# Patient Record
Sex: MALE | Race: BLACK OR AFRICAN AMERICAN | NOT HISPANIC OR LATINO | Employment: PART TIME | URBAN - METROPOLITAN AREA
[De-identification: names, ages, dates, MRNs, and addresses within clinical notes are randomized per-mention and may not be internally consistent; named-entity substitution may affect disease eponyms.]

---

## 2018-11-27 ENCOUNTER — HOSPITAL ENCOUNTER (INPATIENT)
Facility: HOSPITAL | Age: 58
LOS: 3 days | Discharge: HOME | DRG: 139 | End: 2018-12-01
Attending: EMERGENCY MEDICINE | Admitting: INTERNAL MEDICINE
Payer: COMMERCIAL

## 2018-11-27 ENCOUNTER — APPOINTMENT (EMERGENCY)
Dept: NON INVASIVE DIAGNOSTICS | Facility: HOSPITAL | Age: 58
DRG: 139 | End: 2018-11-27
Payer: COMMERCIAL

## 2018-11-27 ENCOUNTER — APPOINTMENT (EMERGENCY)
Dept: RADIOLOGY | Facility: HOSPITAL | Age: 58
DRG: 139 | End: 2018-11-27
Payer: COMMERCIAL

## 2018-11-27 DIAGNOSIS — R04.2 HEMOPTYSIS: Primary | ICD-10-CM

## 2018-11-27 DIAGNOSIS — R91.8 RIGHT LOWER LOBE LUNG MASS: ICD-10-CM

## 2018-11-27 DIAGNOSIS — R93.89 ABNORMAL CT SCAN: ICD-10-CM

## 2018-11-27 DIAGNOSIS — I10 ELEVATED SYSTOLIC BLOOD PRESSURE READING WITH DIAGNOSIS OF HYPERTENSION: ICD-10-CM

## 2018-11-27 DIAGNOSIS — R05.8 PRODUCTIVE COUGH: ICD-10-CM

## 2018-11-27 PROBLEM — E78.5 HYPERLIPIDEMIA: Status: ACTIVE | Noted: 2018-04-01

## 2018-11-27 PROBLEM — G47.33 OSA (OBSTRUCTIVE SLEEP APNEA): Status: ACTIVE | Noted: 2018-04-01

## 2018-11-27 PROBLEM — E11.9 TYPE 2 DIABETES MELLITUS WITHOUT COMPLICATION (HCC): Status: ACTIVE | Noted: 2018-04-01

## 2018-11-27 PROBLEM — F17.200 TOBACCO USE DISORDER: Status: ACTIVE | Noted: 2018-04-01

## 2018-11-27 LAB
ALBUMIN SERPL BCP-MCNC: 3.4 G/DL (ref 3.5–5)
ALP SERPL-CCNC: 67 U/L (ref 46–116)
ALT SERPL W P-5'-P-CCNC: 24 U/L (ref 12–78)
ANION GAP SERPL CALCULATED.3IONS-SCNC: 6 MMOL/L (ref 4–13)
APTT PPP: 26 SECONDS (ref 26–38)
AST SERPL W P-5'-P-CCNC: 18 U/L (ref 5–45)
BASOPHILS # BLD AUTO: 0.07 THOUSANDS/ΜL (ref 0–0.1)
BASOPHILS NFR BLD AUTO: 1 % (ref 0–1)
BILIRUB SERPL-MCNC: 0.44 MG/DL (ref 0.2–1)
BUN SERPL-MCNC: 16 MG/DL (ref 5–25)
CALCIUM SERPL-MCNC: 9.6 MG/DL (ref 8.3–10.1)
CHLORIDE SERPL-SCNC: 109 MMOL/L (ref 100–108)
CO2 SERPL-SCNC: 25 MMOL/L (ref 21–32)
CREAT SERPL-MCNC: 1.01 MG/DL (ref 0.6–1.3)
EOSINOPHIL # BLD AUTO: 0.16 THOUSAND/ΜL (ref 0–0.61)
EOSINOPHIL NFR BLD AUTO: 3 % (ref 0–6)
ERYTHROCYTE [DISTWIDTH] IN BLOOD BY AUTOMATED COUNT: 13.1 % (ref 11.6–15.1)
GFR SERPL CREATININE-BSD FRML MDRD: 94 ML/MIN/1.73SQ M
GLUCOSE SERPL-MCNC: 90 MG/DL (ref 65–140)
HCT VFR BLD AUTO: 43.7 % (ref 36.5–49.3)
HGB BLD-MCNC: 14.2 G/DL (ref 12–17)
IMM GRANULOCYTES # BLD AUTO: 0.03 THOUSAND/UL (ref 0–0.2)
IMM GRANULOCYTES NFR BLD AUTO: 1 % (ref 0–2)
INR PPP: 0.97 (ref 0.86–1.17)
LYMPHOCYTES # BLD AUTO: 2.38 THOUSANDS/ΜL (ref 0.6–4.47)
LYMPHOCYTES NFR BLD AUTO: 40 % (ref 14–44)
MCH RBC QN AUTO: 30.3 PG (ref 26.8–34.3)
MCHC RBC AUTO-ENTMCNC: 32.5 G/DL (ref 31.4–37.4)
MCV RBC AUTO: 93 FL (ref 82–98)
MONOCYTES # BLD AUTO: 0.56 THOUSAND/ΜL (ref 0.17–1.22)
MONOCYTES NFR BLD AUTO: 9 % (ref 4–12)
NEUTROPHILS # BLD AUTO: 2.83 THOUSANDS/ΜL (ref 1.85–7.62)
NEUTS SEG NFR BLD AUTO: 46 % (ref 43–75)
NRBC BLD AUTO-RTO: 0 /100 WBCS
PLATELET # BLD AUTO: 275 THOUSANDS/UL (ref 149–390)
PMV BLD AUTO: 9.5 FL (ref 8.9–12.7)
POTASSIUM SERPL-SCNC: 3.8 MMOL/L (ref 3.5–5.3)
PROCALCITONIN SERPL-MCNC: <0.05 NG/ML
PROT SERPL-MCNC: 6.7 G/DL (ref 6.4–8.2)
PROTHROMBIN TIME: 13 SECONDS (ref 11.8–14.2)
RBC # BLD AUTO: 4.69 MILLION/UL (ref 3.88–5.62)
SODIUM SERPL-SCNC: 140 MMOL/L (ref 136–145)
TROPONIN I SERPL-MCNC: <0.02 NG/ML
WBC # BLD AUTO: 6.03 THOUSAND/UL (ref 4.31–10.16)

## 2018-11-27 PROCEDURE — 36415 COLL VENOUS BLD VENIPUNCTURE: CPT | Performed by: EMERGENCY MEDICINE

## 2018-11-27 PROCEDURE — 87205 SMEAR GRAM STAIN: CPT | Performed by: INTERNAL MEDICINE

## 2018-11-27 PROCEDURE — 87631 RESP VIRUS 3-5 TARGETS: CPT | Performed by: INTERNAL MEDICINE

## 2018-11-27 PROCEDURE — 93970 EXTREMITY STUDY: CPT

## 2018-11-27 PROCEDURE — 87449 NOS EACH ORGANISM AG IA: CPT | Performed by: INTERNAL MEDICINE

## 2018-11-27 PROCEDURE — 93005 ELECTROCARDIOGRAM TRACING: CPT

## 2018-11-27 PROCEDURE — 84145 PROCALCITONIN (PCT): CPT | Performed by: INTERNAL MEDICINE

## 2018-11-27 PROCEDURE — 87070 CULTURE OTHR SPECIMN AEROBIC: CPT | Performed by: INTERNAL MEDICINE

## 2018-11-27 PROCEDURE — 84484 ASSAY OF TROPONIN QUANT: CPT | Performed by: EMERGENCY MEDICINE

## 2018-11-27 PROCEDURE — 99285 EMERGENCY DEPT VISIT HI MDM: CPT

## 2018-11-27 PROCEDURE — 85610 PROTHROMBIN TIME: CPT | Performed by: EMERGENCY MEDICINE

## 2018-11-27 PROCEDURE — 96367 TX/PROPH/DG ADDL SEQ IV INF: CPT

## 2018-11-27 PROCEDURE — 85025 COMPLETE CBC W/AUTO DIFF WBC: CPT | Performed by: EMERGENCY MEDICINE

## 2018-11-27 PROCEDURE — 96365 THER/PROPH/DIAG IV INF INIT: CPT

## 2018-11-27 PROCEDURE — 71275 CT ANGIOGRAPHY CHEST: CPT

## 2018-11-27 PROCEDURE — 80053 COMPREHEN METABOLIC PANEL: CPT | Performed by: EMERGENCY MEDICINE

## 2018-11-27 PROCEDURE — 85730 THROMBOPLASTIN TIME PARTIAL: CPT | Performed by: EMERGENCY MEDICINE

## 2018-11-27 PROCEDURE — 99220 PR INITIAL OBSERVATION CARE/DAY 70 MINUTES: CPT | Performed by: INTERNAL MEDICINE

## 2018-11-27 RX ORDER — MAGNESIUM HYDROXIDE/ALUMINUM HYDROXICE/SIMETHICONE 120; 1200; 1200 MG/30ML; MG/30ML; MG/30ML
30 SUSPENSION ORAL EVERY 6 HOURS PRN
Status: DISCONTINUED | OUTPATIENT
Start: 2018-11-27 | End: 2018-12-01 | Stop reason: HOSPADM

## 2018-11-27 RX ORDER — ATORVASTATIN CALCIUM 40 MG/1
40 TABLET, FILM COATED ORAL
COMMUNITY
Start: 2018-05-31

## 2018-11-27 RX ORDER — METOPROLOL SUCCINATE 50 MG/1
50 TABLET, EXTENDED RELEASE ORAL
COMMUNITY
Start: 2018-05-31

## 2018-11-27 RX ORDER — ALBUTEROL SULFATE 90 UG/1
2 AEROSOL, METERED RESPIRATORY (INHALATION) EVERY 4 HOURS PRN
Status: DISCONTINUED | OUTPATIENT
Start: 2018-11-27 | End: 2018-12-01 | Stop reason: HOSPADM

## 2018-11-27 RX ORDER — METOPROLOL SUCCINATE 50 MG/1
50 TABLET, EXTENDED RELEASE ORAL DAILY
Status: DISCONTINUED | OUTPATIENT
Start: 2018-11-28 | End: 2018-12-01 | Stop reason: HOSPADM

## 2018-11-27 RX ORDER — FAMOTIDINE 20 MG/1
20 TABLET, FILM COATED ORAL ONCE
Status: COMPLETED | OUTPATIENT
Start: 2018-11-27 | End: 2018-11-27

## 2018-11-27 RX ORDER — LISINOPRIL 40 MG/1
40 TABLET ORAL
COMMUNITY
Start: 2018-05-31

## 2018-11-27 RX ORDER — MAGNESIUM HYDROXIDE/ALUMINUM HYDROXICE/SIMETHICONE 120; 1200; 1200 MG/30ML; MG/30ML; MG/30ML
15 SUSPENSION ORAL ONCE
Status: COMPLETED | OUTPATIENT
Start: 2018-11-27 | End: 2018-11-27

## 2018-11-27 RX ORDER — ATORVASTATIN CALCIUM 40 MG/1
40 TABLET, FILM COATED ORAL
Status: DISCONTINUED | OUTPATIENT
Start: 2018-11-28 | End: 2018-12-01 | Stop reason: HOSPADM

## 2018-11-27 RX ORDER — LISINOPRIL 20 MG/1
40 TABLET ORAL DAILY
Status: DISCONTINUED | OUTPATIENT
Start: 2018-11-28 | End: 2018-12-01 | Stop reason: HOSPADM

## 2018-11-27 RX ORDER — NICOTINE 21 MG/24HR
1 PATCH, TRANSDERMAL 24 HOURS TRANSDERMAL DAILY
Status: DISCONTINUED | OUTPATIENT
Start: 2018-11-28 | End: 2018-12-01 | Stop reason: HOSPADM

## 2018-11-27 RX ADMIN — AZITHROMYCIN MONOHYDRATE 500 MG: 500 INJECTION, POWDER, LYOPHILIZED, FOR SOLUTION INTRAVENOUS at 19:57

## 2018-11-27 RX ADMIN — FAMOTIDINE 20 MG: 20 TABLET ORAL at 21:30

## 2018-11-27 RX ADMIN — ALUMINUM HYDROXIDE, MAGNESIUM HYDROXIDE, AND SIMETHICONE 15 ML: 200; 200; 20 SUSPENSION ORAL at 21:30

## 2018-11-27 RX ADMIN — METOPROLOL TARTRATE 25 MG: 25 TABLET ORAL at 20:44

## 2018-11-27 RX ADMIN — CEFTRIAXONE SODIUM 1000 MG: 10 INJECTION, POWDER, FOR SOLUTION INTRAVENOUS at 18:47

## 2018-11-27 RX ADMIN — IOHEXOL 85 ML: 350 INJECTION, SOLUTION INTRAVENOUS at 17:37

## 2018-11-27 NOTE — ED NOTES
Pt reports just started smoking cigarettes again   Pt coughed during triage and sputum was clear     Abhi Castellanos RN  11/27/18 2922

## 2018-11-27 NOTE — LETTER
[address] [place] [address] 6  [address] [address]  Dept: [phone]    November 30, 2018     Patient: Leta Leong   YOB: 1960   Date of Visit: 11/27/2018       To Whom it May Concern:    Leta Leong is under my professional care  He was seen in the hospital from 11/27/2018   to 12/1/18  He must refrain from strenuous activities while he's recovering for a period of two weeks after his hospitalization  If you have any questions or concerns, please don't hesitate to call           Sincerely,          Syed Mitchell MD

## 2018-11-27 NOTE — ED PROVIDER NOTES
History  Chief Complaint   Patient presents with    Medical Problem     Pt reports coughing up blood x2days  Pt denies SOB, cp     44-year-old male presents emergency department for evaluation of 2 day history of hemoptysis  Patient states 2 week history cough productive with white/clear sputum which he states now has streaks of blood  Pt also states recently noted bilateral LE edema L > R which he states no hx of previously  Pt denies any trauma/injury, headache, vision changes, dizziness/lightheadedness/syncope, neck pain/back pain, chest pain, stridor/wheezing, fever/chills, abdominal pain, nausea/vomiting/diarrhea, urinary symptoms, rash focal neuro deficits  Patient has past medical history of hypertension, hyperlipidemia and diabetes  Pt has a past surgical history that includes Rotator cuff repair (Right) and Repair knee ligament (Left)  Pt is a current smoker, occasional ETOH use, denies any recreational drug use  No recent travel or known sick contacts  No recent changes to medication regimen  No interventions prior to arrival, no other complaints at this time  Prior to Admission Medications   Prescriptions Last Dose Informant Patient Reported? Taking?    ASPIRIN 81 PO 11/27/2018 at Unknown time  Yes Yes   Sig: Take 81 mg by mouth   atorvastatin (LIPITOR) 40 mg tablet 11/27/2018 at Unknown time  Yes Yes   Sig: Take 40 mg by mouth   lisinopril (ZESTRIL) 40 mg tablet 11/27/2018 at Unknown time  Yes Yes   Sig: Take 40 mg by mouth   metFORMIN (GLUCOPHAGE) 500 mg tablet 11/27/2018 at Unknown time  Yes Yes   Sig: Take 500 mg by mouth   metoprolol succinate (TOPROL-XL) 50 mg 24 hr tablet 11/27/2018 at Unknown time  Yes Yes   Sig: Take 50 mg by mouth      Facility-Administered Medications: None       Past Medical History:   Diagnosis Date    Diabetes mellitus ([address] )     Hyperlipidemia     Hypertension     Sciatica        Past Surgical History:   Procedure Laterality Date    REPAIR KNEE LIGAMENT Left     ROTATOR CUFF REPAIR Right        Family History   Problem Relation Age of Onset    Lung cancer Father      I have reviewed and agree with the history as documented  Social History   Substance Use Topics    Smoking status: Current Every Day Smoker     Packs/day: 2 00     Years: 30 00     Types: Cigarettes    Smokeless tobacco: Never Used    Alcohol use No        Review of Systems   Constitutional: Negative for chills, fatigue and fever  HENT: Negative for congestion, rhinorrhea and sore throat  Eyes: Negative for pain, redness and visual disturbance  Respiratory: Positive for cough (with hemoptysis)  Negative for chest tightness, shortness of breath, wheezing and stridor  Cardiovascular: Positive for leg swelling (johanne LE swelling L > R)  Negative for chest pain and palpitations  Gastrointestinal: Negative for abdominal pain, blood in stool, constipation, diarrhea, nausea and vomiting  Genitourinary: Negative for dysuria, frequency, hematuria and urgency  Musculoskeletal: Negative for back pain and neck pain  Skin: Negative for pallor and rash  Neurological: Negative for dizziness, seizures, syncope, weakness, light-headedness and headaches  Psychiatric/Behavioral: Negative for agitation and confusion         Physical Exam  ED Triage Vitals   Temperature Pulse Respirations Blood Pressure SpO2   11/27/18 1508 11/27/18 1508 11/27/18 1508 11/27/18 1508 11/27/18 1508   98 °F (36 7 °C) 87 16 167/91 94 %      Temp Source Heart Rate Source Patient Position - Orthostatic VS BP Location FiO2 (%)   11/27/18 1508 11/27/18 1508 11/27/18 1630 11/27/18 1508 --   Oral Monitor Lying Left arm       Pain Score       11/27/18 2002       No Pain           Orthostatic Vital Signs  Vitals:    11/27/18 2100 11/27/18 2235 11/28/18 0832 11/28/18 0834   BP: 161/90 (!) 165/104 144/78 144/78   Pulse: 72 64 71 57   Patient Position - Orthostatic VS: Lying          Physical Exam   Constitutional: He is oriented to person, place, and time  He appears well-developed and well-nourished  No distress  HENT:   Head: Normocephalic and atraumatic  Nose: Nose normal    Mouth/Throat: Oropharynx is clear and moist  No oropharyngeal exudate  Eyes: Pupils are equal, round, and reactive to light  Conjunctivae and EOM are normal  Right eye exhibits no discharge  Left eye exhibits no discharge  Neck: Normal range of motion  Neck supple  No JVD present  No tracheal deviation present  Cardiovascular: Normal rate, regular rhythm, normal heart sounds and intact distal pulses  Exam reveals no gallop and no friction rub  No murmur heard  Pulmonary/Chest: Effort normal and breath sounds normal  No stridor  No respiratory distress  He has no wheezes  He has no rales  He exhibits no tenderness  Bedside container with sputum with streaks of blood noted   Abdominal: Soft  Bowel sounds are normal  He exhibits no distension  There is no tenderness  There is no rebound and no guarding  Musculoskeletal: Normal range of motion  He exhibits edema (bilateral LE pitting edema L > R)  He exhibits no tenderness or deformity  Neurological: He is alert and oriented to person, place, and time  No cranial nerve deficit  Skin: Skin is warm and dry  Capillary refill takes less than 2 seconds  No rash noted  He is not diaphoretic  Psychiatric: He has a normal mood and affect  Nursing note and vitals reviewed        ED Medications  Medications   lisinopril (ZESTRIL) tablet 40 mg (40 mg Oral Given 11/28/18 0835)   atorvastatin (LIPITOR) tablet 40 mg (not administered)   metoprolol succinate (TOPROL-XL) 24 hr tablet 50 mg (50 mg Oral Given 11/28/18 0834)   nicotine (NICODERM CQ) 21 mg/24 hr TD 24 hr patch 1 patch (1 patch Transdermal Not Given 11/28/18 0840)   aluminum-magnesium hydroxide-simethicone (MYLANTA) 200-200-20 mg/5 mL oral suspension 30 mL (not administered)   cefTRIAXone (ROCEPHIN) 1,000 mg in dextrose 5 % 50 mL IVPB (not administered)     And   azithromycin (ZITHROMAX) 500 mg in sodium chloride 0 9 % 250 mL IVPB (not administered)   insulin lispro (HumaLOG) 100 units/mL subcutaneous injection 1-6 Units (1 Units Subcutaneous Not Given 11/28/18 0839)   insulin lispro (HumaLOG) 100 units/mL subcutaneous injection 1-5 Units (1 Units Subcutaneous Not Given 11/28/18 0033)   albuterol (PROVENTIL HFA,VENTOLIN HFA) inhaler 2 puff (not administered)   iohexol (OMNIPAQUE) 350 MG/ML injection (MULTI-DOSE) 85 mL (85 mL Intravenous Given 11/27/18 1737)   ceftriaxone (ROCEPHIN) 1 g/50 mL in dextrose IVPB (0 mg Intravenous Stopped 11/27/18 1951)   azithromycin (ZITHROMAX) 500 mg in sodium chloride 0 9% 250mL IVPB 500 mg (0 mg Intravenous Stopped 11/27/18 2044)   metoprolol tartrate (LOPRESSOR) tablet 25 mg (25 mg Oral Given 11/27/18 2044)   famotidine (PEPCID) tablet 20 mg (20 mg Oral Given 11/27/18 2130)   aluminum-magnesium hydroxide-simethicone (MYLANTA) 200-200-20 mg/5 mL oral suspension 15 mL (15 mL Oral Given 11/27/18 2130)       Diagnostic Studies  Results Reviewed     Procedure Component Value Units Date/Time    Troponin I [[id number]]  (Normal) Collected:  11/27/18 1629    Lab Status:  Final result Specimen:  Blood from Arm, Right Updated:  11/27/18 1700     Troponin I <0 02 ng/mL     Comprehensive metabolic panel [[id number]]  (Abnormal) Collected:  11/27/18 1629    Lab Status:  Final result Specimen:  Blood from Arm, Right Updated:  11/27/18 1658     Sodium 140 mmol/L      Potassium 3 8 mmol/L      Chloride 109 (H) mmol/L      CO2 25 mmol/L      ANION GAP 6 mmol/L      BUN 16 mg/dL      Creatinine 1 01 mg/dL      Glucose 90 mg/dL      Calcium 9 6 mg/dL      AST 18 U/L      ALT 24 U/L      Alkaline Phosphatase 67 U/L      Total Protein 6 7 g/dL      Albumin 3 4 (L) g/dL      Total Bilirubin 0 44 mg/dL      eGFR 94 ml/min/1 73sq m     Narrative:         National Kidney Disease Education Program recommendations are as follows:  GFR calculation is accurate only with a steady state creatinine  Chronic Kidney disease less than 60 ml/min/1 73 sq  meters  Kidney failure less than 15 ml/min/1 73 sq  meters  Protime-INR [[id number]]  (Normal) Collected:  11/27/18 1629    Lab Status:  Final result Specimen:  Blood from Arm, Right Updated:  11/27/18 1654     Protime 13 0 seconds      INR 0 97    APTT [[id number]]  (Normal) Collected:  11/27/18 1629    Lab Status:  Final result Specimen:  Blood from Arm, Right Updated:  11/27/18 1654     PTT 26 seconds     CBC and differential [[id number]] Collected:  11/27/18 1629    Lab Status:  Final result Specimen:  Blood from Arm, Right Updated:  11/27/18 1639     WBC 6 03 Thousand/uL      RBC 4 69 Million/uL      Hemoglobin 14 2 g/dL      Hematocrit 43 7 %      MCV 93 fL      MCH 30 3 pg      MCHC 32 5 g/dL      RDW 13 1 %      MPV 9 5 fL      Platelets 256 Thousands/uL      nRBC 0 /100 WBCs      Neutrophils Relative 46 %      Immat GRANS % 1 %      Lymphocytes Relative 40 %      Monocytes Relative 9 %      Eosinophils Relative 3 %      Basophils Relative 1 %      Neutrophils Absolute 2 83 Thousands/µL      Immature Grans Absolute 0 03 Thousand/uL      Lymphocytes Absolute 2 38 Thousands/µL      Monocytes Absolute 0 56 Thousand/µL      Eosinophils Absolute 0 16 Thousand/µL      Basophils Absolute 0 07 Thousands/µL                  CTA ED chest PE study   Final Result by Alejandro Barnes MD (11/27 1802)      Bilateral lung emphysematous changes predominantly right upper lobe  Right lower lobe consolidation measuring approximately 6 cm possibly related to pneumonia  Posttreatment follow-up to radiographic resolution is recommended in 1-2 months to exclude other etiology such as neoplasm which is not excluded (other less likely    differential would be from a nonvisualized subsegmental pulmonary embolus)        2 cm low attenuating lesion in the pancreatic head, correlate with nonemergent outpatient contrast enhanced abdominal MRI or CT recommended  The study was marked in EPIC for significant notification  Workstation performed: [id number]         VAS lower limb venous duplex study, complete bilateral    (Results Pending)  CONCLUSION:  RIGHT LOWER LIMB:  No evidence of acute or chronic deep vein thrombosis  No evidence of superficial thrombophlebitis noted  Doppler evaluation shows a normal response to augmentation maneuvers  Popliteal, posterior tibial and anterior tibial arterial Doppler waveforms are  triphasic  LEFT LOWER LIMB:  No evidence of acute or chronic deep vein thrombosis  No evidence of superficial thrombophlebitis noted  Doppler evaluation shows a normal response to augmentation maneuvers  Popliteal, posterior tibial and anterior tibial arterial Doppler waveforms are  triphasic  Procedures  ECG 12 Lead Documentation  Date/Time: 11/27/2018 4:33 PM  Performed by: Zhang Gibson by: Consuelo Thomas     ECG reviewed by me, the ED Provider: yes    Patient location:  ED  Previous ECG:     Previous ECG:  Unavailable  Interpretation:     Interpretation: abnormal    Rate:     ECG rate assessment: normal    Rhythm:     Rhythm: sinus rhythm    Ectopy:     Ectopy: none    QRS:     QRS axis:  Normal  ST segments:     ST segments:  Normal  T waves:     T waves: inverted      Inverted:  AVR, II and III          Phone Consults  ED Phone Contact    ED Course              Patient reevaluated with improvement in condition noted  Patient updated on results of tests and plan of care including admission to hospital for further evaluation of presenting complaint with understanding verbalized  Report to Dr Stephanie Humphreys with [facility] for continuation of patient care                       [facility] Time    Disposition  Final diagnoses:   Hemoptysis   Elevated systolic blood pressure reading with diagnosis of hypertension   Abnormal CT scan   Productive cough     Time reflects when diagnosis was documented in both MDM as applicable and the Disposition within this note     Time User Action Codes Description Comment    11/27/2018  8:31 PM Lupe Kennethlucie No Add [R04 2] Hemoptysis     11/27/2018  8:31 PM Star Andrade No Add [W03] Elevated systolic blood pressure reading with diagnosis of hypertension     11/27/2018  8:32 PM LupeStar No Add [R93 89] Abnormal CT scan     11/27/2018  8:32 PM Lupe Carlotta SENG Add [R05] Productive cough     11/27/2018  8:52 PM Kim Never D Modify [R04 2] Hemoptysis     11/27/2018  8:52 PM Kim Never D Add [R91 8] Right lower lobe lung mass     11/27/2018  8:52 PM Kim Never D Modify [R04 2] Hemoptysis     11/27/2018  8:52 PM Kim Never D Modify [R91 8] Right lower lobe lung mass     11/27/2018  8:52 PM Kim Never D Modify [R04 2] Hemoptysis     11/27/2018  9:04 PM Kim Never D Modify [R04 2] Hemoptysis       ED Disposition     ED Disposition Condition Comment    Admit  Case was discussed with [facility] and the patient's admission status was agreed to be Admission Status: observation status to the service of Dr Maia Guerra  Follow-up Information    None         Current Discharge Medication List      CONTINUE these medications which have NOT CHANGED    Details   ASPIRIN 81 PO Take 81 mg by mouth      atorvastatin (LIPITOR) 40 mg tablet Take 40 mg by mouth      lisinopril (ZESTRIL) 40 mg tablet Take 40 mg by mouth      metFORMIN (GLUCOPHAGE) 500 mg tablet Take 500 mg by mouth      metoprolol succinate (TOPROL-XL) 50 mg 24 hr tablet Take 50 mg by mouth           No discharge procedures on file  ED Provider  Attending physically available and evaluated Leonardo Barragan I managed the patient along with the ED Attending      Electronically Signed by         Deedee Morris DO  11/28/18 3482

## 2018-11-27 NOTE — ED ATTENDING ATTESTATION
Jose Lyons MD, saw and evaluated the patient  I have discussed the patient with the resident/non-physician practitioner and agree with the resident's/non-physician practitioner's findings, Plan of Care, and MDM as documented in the resident's/non-physician practitioner's note, except where noted  All available labs and Radiology studies were reviewed  At this point I agree with the current assessment done in the Emergency Department  I have conducted an independent evaluation of this patient a history and physical is as follows:    21-year-old man presents with 2 weeks of hemoptysis, lower extremity swelling, 15 lb weight loss  On exam awake and alert, no acute distress  Heart regular rate rhythm, no murmurs rubs or gallops  Lungs clear to auscultation bilaterally  Trace bilateral lower extremity edema  Plan labs, imaging        Critical Care Time  CritCare Time    Procedures

## 2018-11-28 ENCOUNTER — ANESTHESIA EVENT (INPATIENT)
Dept: GASTROENTEROLOGY | Facility: HOSPITAL | Age: 58
DRG: 139 | End: 2018-11-28
Payer: COMMERCIAL

## 2018-11-28 ENCOUNTER — APPOINTMENT (INPATIENT)
Dept: RADIOLOGY | Facility: HOSPITAL | Age: 58
DRG: 139 | End: 2018-11-28
Payer: COMMERCIAL

## 2018-11-28 PROBLEM — Q45.3 PANCREATIC ABNORMALITY: Status: ACTIVE | Noted: 2018-11-28

## 2018-11-28 LAB
ANION GAP SERPL CALCULATED.3IONS-SCNC: 5 MMOL/L (ref 4–13)
ATRIAL RATE: 74 BPM
BUN SERPL-MCNC: 11 MG/DL (ref 5–25)
CALCIUM SERPL-MCNC: 9.2 MG/DL (ref 8.3–10.1)
CHLORIDE SERPL-SCNC: 110 MMOL/L (ref 100–108)
CO2 SERPL-SCNC: 23 MMOL/L (ref 21–32)
CREAT SERPL-MCNC: 0.74 MG/DL (ref 0.6–1.3)
EST. AVERAGE GLUCOSE BLD GHB EST-MCNC: 128 MG/DL
FLUAV AG SPEC QL: NORMAL
FLUBV AG SPEC QL: NORMAL
GFR SERPL CREATININE-BSD FRML MDRD: 117 ML/MIN/1.73SQ M
GLUCOSE SERPL-MCNC: 112 MG/DL (ref 65–140)
GLUCOSE SERPL-MCNC: 76 MG/DL (ref 65–140)
GLUCOSE SERPL-MCNC: 88 MG/DL (ref 65–140)
GLUCOSE SERPL-MCNC: 89 MG/DL (ref 65–140)
GLUCOSE SERPL-MCNC: 91 MG/DL (ref 65–140)
GLUCOSE SERPL-MCNC: 92 MG/DL (ref 65–140)
HBA1C MFR BLD: 6.1 % (ref 4.2–6.3)
L PNEUMO1 AG UR QL IA.RAPID: NEGATIVE
NT-PROBNP SERPL-MCNC: 62 PG/ML
P AXIS: 74 DEGREES
POTASSIUM SERPL-SCNC: 3.9 MMOL/L (ref 3.5–5.3)
PR INTERVAL: 186 MS
QRS AXIS: 37 DEGREES
QRSD INTERVAL: 110 MS
QT INTERVAL: 374 MS
QTC INTERVAL: 415 MS
RSV B RNA SPEC QL NAA+PROBE: NORMAL
S PNEUM AG UR QL: NEGATIVE
SODIUM SERPL-SCNC: 138 MMOL/L (ref 136–145)
T WAVE AXIS: 54 DEGREES
VENTRICULAR RATE: 74 BPM

## 2018-11-28 PROCEDURE — 94760 N-INVAS EAR/PLS OXIMETRY 1: CPT

## 2018-11-28 PROCEDURE — 83036 HEMOGLOBIN GLYCOSYLATED A1C: CPT | Performed by: INTERNAL MEDICINE

## 2018-11-28 PROCEDURE — 74176 CT ABD & PELVIS W/O CONTRAST: CPT

## 2018-11-28 PROCEDURE — 83880 ASSAY OF NATRIURETIC PEPTIDE: CPT | Performed by: PHYSICIAN ASSISTANT

## 2018-11-28 PROCEDURE — 93970 EXTREMITY STUDY: CPT | Performed by: SURGERY

## 2018-11-28 PROCEDURE — 94660 CPAP INITIATION&MGMT: CPT

## 2018-11-28 PROCEDURE — 99225 PR SBSQ OBSERVATION CARE/DAY 25 MINUTES: CPT | Performed by: PHYSICIAN ASSISTANT

## 2018-11-28 PROCEDURE — 82948 REAGENT STRIP/BLOOD GLUCOSE: CPT

## 2018-11-28 PROCEDURE — 99255 IP/OBS CONSLTJ NEW/EST HI 80: CPT | Performed by: INTERNAL MEDICINE

## 2018-11-28 PROCEDURE — 93010 ELECTROCARDIOGRAM REPORT: CPT | Performed by: INTERNAL MEDICINE

## 2018-11-28 PROCEDURE — 80048 BASIC METABOLIC PNL TOTAL CA: CPT | Performed by: INTERNAL MEDICINE

## 2018-11-28 RX ADMIN — LISINOPRIL 40 MG: 20 TABLET ORAL at 08:35

## 2018-11-28 RX ADMIN — CEFTRIAXONE 1000 MG: 1 INJECTION, POWDER, FOR SOLUTION INTRAMUSCULAR; INTRAVENOUS at 18:18

## 2018-11-28 RX ADMIN — ATORVASTATIN CALCIUM 40 MG: 40 TABLET, FILM COATED ORAL at 18:17

## 2018-11-28 RX ADMIN — AZITHROMYCIN MONOHYDRATE 500 MG: 500 INJECTION, POWDER, LYOPHILIZED, FOR SOLUTION INTRAVENOUS at 19:48

## 2018-11-28 RX ADMIN — METOPROLOL SUCCINATE 50 MG: 50 TABLET, EXTENDED RELEASE ORAL at 08:34

## 2018-11-28 NOTE — ASSESSMENT & PLAN NOTE
· New onset for patient  Patient is high risk for malignancy given his history of tobacco abuse  · CTA PE study negative for PE however positive for 6cm lower lobe mass, suspicious for pneumonia vs neoplasm vs (less likely) non-imaged subsegmental PE; no prior lung imaging available for review  · Pulmonary consult currently in progress  Anticipate patient will need bronchoscopy  · ASA on hold at this time in anticipation for bronchoscopy with biopsy and bronchial washings  · Has received azithromycin/ceftriaxone in ED  · Strep pneumonia and Legionella are negative  · Procalcitonin is negative  · Will monitor off antibiotics    · Influenza PCR:  Pending

## 2018-11-28 NOTE — ASSESSMENT & PLAN NOTE
· Elevated on admission, however patient admits to taking medications this AM   · Continue metoprolol, lisinopril

## 2018-11-28 NOTE — H&P
H&P- Marco Branch 1960, 60 y o  male MRN: [id number]    Unit/Bed#: ED 27 Encounter: [id number]    Primary Care Provider: Laci Christopher MD   Date and time admitted to hospital: 11/27/2018  3:37 PM      * Hemoptysis   Assessment & Plan    · New onset for patient  CTA PE study negative for PE however positive for 6cm lower lobe mass, suspicious for pneumonia vs neoplasm vs (less likely) non-imaged subsegmental PE; no prior lung imaging available for review  LE venous duplex pending, however informal read reportedly negative for DVT  · Hold ASA at this time  · Has received azithromycin/ceftriaxone at this time  · Given risk factors for malignancy will consult Pulmonlogy to assess need/timing of further workup  · Defer systemic anticoagulation given active hemoptysis, lack of clear evidence for DVT/PE       Essential hypertension   Assessment & Plan    · Elevated on admission, however patient admits to taking medications this AM   · Continue metoprolol, lisinopril     Right lower lobe lung mass   Assessment & Plan    6cm RLL mass noted on CTA PE study, no prior lung imaging studies available  · Pulmonology consultation as above     Hyperlipidemia   Assessment & Plan    · Chronic, will continue statin     JUNITO (obstructive sleep apnea)   Assessment & Plan    -Stable, CPAP     Type 2 diabetes mellitus without complication ([facility] [address] )   Assessment & Plan    No results found for: HGBA1C    No results for input(s): POCGLU in the last 72 hours  Blood Sugar Average: Last 72 hrs: Will obtain A1C, hold metformin, initiate SSI and accu-checks           Tobacco use disorder   Assessment & Plan    · Current smoker  · Smoking cessation counseling, nicotine patch                     VTE Prophylaxis: Pharmacologic VTE Prophylaxis contraindicated due to hemoptysis  / ambulation  Code Status: Level 1 - Full code as discussed with patient  POLST: There is no POLST form on file for this patient (pre-hospital)    Anticipated Length of Stay:  Patient will be admitted on an Observation basis with an anticipated length of stay of  Less than 2 midnights  Justification for Hospital Stay: Please see detailed plans noted above  Chief Complaint:     Hemoptysis  History of Present Illness:  Miguel Singh is a 60 y o  male who has past medical history significant for hypertension, DM2, and tobacco abuse of approximately 60 pack years  He presents today with complaint of productive cough of 2 weeks duration, with new onset hemoptysis for the past 2 days  He describes the hemoptysis as red, mixed with normal sputum, with some clots  Complains of new onset leg swelling approximately 2-3 days, left leg somewhat greater than right, without rash or pain  He denies fever/chills, recent sick contacts, shortness of breath, chest pain/pressure, weight loss, change in appetite, or extensive immobility  Did not have flu shot this year  Takes ASA daily, use anticoagulation  Reports no personal or family history of DVT/PE, but reports history of lung cancer in father  He notes continued hemoptysis since presentation  Review of Systems:    Constitutional:  Denies fever or chills   Eyes:  Denies change in visual acuity   HENT:  Denies nasal congestion or sore throat   Respiratory:  Denies cough or shortness of breath but reports hemoptysis   Cardiovascular:  Denies chest pain or edema   GI:  Denies abdominal pain, nausea, vomiting, bloody stools or diarrhea   :  Denies dysuria   Musculoskeletal:  Denies back pain or joint pain but reports leg swelling, left leg somewhat greater than right    Integument:  Denies rash   Neurologic:  Denies headache, focal weakness or sensory changes   Endocrine:  Denies polyuria or polydipsia   Lymphatic:  Denies swollen glands   Psychiatric:  Denies depression or anxiety     Past Medical and Surgical History:   Past Medical History:   Diagnosis Date    Diabetes mellitus ([facility] [address] )  Hyperlipidemia     Hypertension     Sciatica      Past Surgical History:   Procedure Laterality Date    REPAIR KNEE LIGAMENT Left     ROTATOR CUFF REPAIR Right        Meds/Allergies:    (Not in a hospital admission)    Allergies: No Known Allergies  History:  Marital Status: Single   Occupation: [employer]   Patient Pre-hospital Living Situation: Lives at home   Patient Pre-hospital Level of Mobility: Full   Patient Pre-hospital Diet Restrictions: None  Substance Use History:   History   Alcohol Use No     History   Smoking Status    Current Every Day Smoker    Packs/day: 2 00    Years: 30 00    Types: Cigarettes   Smokeless Tobacco    Never Used     History   Drug Use No       Family History:  Family History   Problem Relation Age of Onset    Lung cancer Father        Physical Exam:     Vitals:   Blood Pressure: (!) 168/111 (11/27/18 2044)  Pulse: 71 (11/27/18 2044)  Temperature: 98 °F (36 7 °C) (11/27/18 1508)  Temp Source: Oral (11/27/18 1508)  Respirations: 22 (11/27/18 2030)  Height: 6' 4" (193 cm) (11/27/18 1508)  SpO2: 96 % (11/27/18 2030)    Constitutional:  Well developed, well nourished, no acute distress, non-toxic appearance   Eyes:  PERRL, conjunctiva normal   HENT:  Atraumatic, external ears normal, nose normal, oropharynx moist, no pharyngeal exudates  Neck- normal range of motion, no tenderness, supple   Respiratory:  No respiratory distress, normal breath sounds, no rales, no wheezing   Cardiovascular:  Normal rate, normal rhythm, no murmurs, no gallops, no rubs   GI:  Soft, nondistended, normal bowel sounds, nontender, no organomegaly, no mass, no rebound, no guarding   :  No costovertebral angle tenderness   Musculoskeletal:  No edema, no tenderness, no deformities   Back- no tenderness  Integument:  Well hydrated, no rash   Lymphatic:  No lymphadenopathy noted   Neurologic:  Alert &awake, communicative, CN 2-12 normal, normal motor function, normal sensory function, no focal deficits noted   Psychiatric:  Speech and behavior appropriate       Lab Results: I have personally reviewed pertinent reports  Results from last 7 days  Lab Units 11/27/18  1629   WBC Thousand/uL 6 03   HEMOGLOBIN g/dL 14 2   HEMATOCRIT % 43 7   PLATELETS Thousands/uL 275   NEUTROS PCT % 46   LYMPHS PCT % 40   MONOS PCT % 9   EOS PCT % 3       Results from last 7 days  Lab Units 11/27/18  1629   POTASSIUM mmol/L 3 8   CHLORIDE mmol/L 109*   CO2 mmol/L 25   BUN mg/dL 16   CREATININE mg/dL 1 01   CALCIUM mg/dL 9 6   ALK PHOS U/L 67   ALT U/L 24   AST U/L 18       Results from last 7 days  Lab Units 11/27/18  1629   INR  0 97       EKG: Normal sinus rhythm    Imaging: I have personally reviewed pertinent reports  and I have personally reviewed pertinent films in PACS    Cta Ed Chest Pe Study    Result Date: 11/27/2018  Narrative: CTA - CHEST WITH IV CONTRAST - PULMONARY ANGIOGRAM INDICATION:   hemoptysis, lower extremity swelling  COMPARISON: None  TECHNIQUE: CTA examination of the chest was performed using angiographic technique according to a protocol specifically tailored to evaluate for pulmonary embolism  Axial, sagittal, and coronal 2D reformatted images were created from the source data and  submitted for interpretation  In addition, coronal 3D MIP postprocessing was performed on the acquisition scanner  Radiation dose length product (DLP) for this visit:  317 35 mGy-cm   This examination, like all CT scans performed in the [facility], was performed utilizing techniques to minimize radiation dose exposure, including the use of iterative  reconstruction and automated exposure control  IV Contrast:  85 mL of iohexol (OMNIPAQUE)  FINDINGS: PULMONARY ARTERIAL TREE:  No pulmonary embolus is seen  LUNGS:  Bilateral lung emphysematous changes predominantly right upper lobe  Right lower lobe consolidation measuring approximately 6 cm possibly related to pneumonia   Posttreatment follow-up to radiographic resolution is recommended in 1-2 months to exclude other etiology such as neoplasm which is not excluded (other less likely differential would be from a nonvisualized subsegmental pulmonary embolus)  PLEURA:  Unremarkable  HEART/GREAT VESSELS:  Unremarkable for patient's age  MEDIASTINUM AND AMBER:  Unremarkable  CHEST WALL AND LOWER NECK:   Unremarkable  VISUALIZED STRUCTURES IN THE UPPER ABDOMEN:  2 cm low attenuating lesion in the pancreatic head, correlate with nonemergent outpatient contrast enhanced abdominal MRI or CT recommended  Partially visualized low-attenuation in the right kidney  OSSEOUS STRUCTURES:  No acute fracture or destructive osseous lesion  Impression: Bilateral lung emphysematous changes predominantly right upper lobe  Right lower lobe consolidation measuring approximately 6 cm possibly related to pneumonia  Posttreatment follow-up to radiographic resolution is recommended in 1-2 months to exclude other etiology such as neoplasm which is not excluded (other less likely  differential would be from a nonvisualized subsegmental pulmonary embolus)  2 cm low attenuating lesion in the pancreatic head, correlate with nonemergent outpatient contrast enhanced abdominal MRI or CT recommended  The study was marked in EPIC for significant notification  Workstation performed: [id number]     Vas Lower Limb Venous Duplex Study, Complete Bilateral    Result Date: 11/27/2018  Narrative:  [facility] REPORT CLINICAL: Indications: Patient presents with bilateral lower extremity edema x 2 weeks  Operative History: No cardiovascular surgeries noted  Risk Factors The patient has history of HTN, Diabetes (Yes), HLD and smoking (current) 1-2 ppd  FINDINGS:  Segment  Right            Left              Impression       Impression       CFV      Normal (Patent)  Normal (Patent)     CONCLUSION: RIGHT LOWER LIMB: No evidence of acute or chronic deep vein thrombosis   No evidence of superficial thrombophlebitis noted  Doppler evaluation shows a normal response to augmentation maneuvers  Popliteal, posterior tibial and anterior tibial arterial Doppler waveforms are triphasic  LEFT LOWER LIMB: No evidence of acute or chronic deep vein thrombosis  No evidence of superficial thrombophlebitis noted  Doppler evaluation shows a normal response to augmentation maneuvers  Popliteal, posterior tibial and anterior tibial arterial Doppler waveforms are triphasic  Technical findings were given to DO Carlotta Andrade in the ED         ** Please Note: Dragon 360 Dictation voice to text software was used in the creation of this document   **

## 2018-11-28 NOTE — PLAN OF CARE
DISCHARGE PLANNING     Discharge to home or other facility with appropriate resources Progressing        Knowledge Deficit     Patient/family/caregiver demonstrates understanding of disease process, treatment plan, medications, and discharge instructions Progressing        PAIN - ADULT     Verbalizes/displays adequate comfort level or baseline comfort level Progressing        SAFETY ADULT     Maintain or return to baseline ADL function Progressing

## 2018-11-28 NOTE — SOCIAL WORK
Met with patient and explained CM program and CM role  Resides in a group home  Independent prior to admission for ADL's and ambulation  PCP Dr Roland Collazo  Has a prescription plan, Uses [facility]   He drives  DME- bipap  [facility]-  [facility] in past  IP Rehab- none  Denies mental health illness, IP or OP psyche care, drug and/or alcohol abuse  Primary contact Solange Zuniga [facility] , [phone]  No POA  Clinton Govea will transport home    CM reviewed d/c planning process including the following: identifying help at home, patient preference for d/c planning needs, Discharge Lounge, [facility] Meds to Bed program, availability of treatment team to discuss questions or concerns patient and/or family may have regarding understanding medications and recognizing signs and symptoms once discharged  CM also encouraged patient to follow up with all recommended appointments after discharge  Patient advised of importance for patient and family to participate in managing patients medical well being   Patient/caregiver received discharge checklist  Content reviewed  Patient/caregiver encouraged to participate in discharge plan of care prior to discharge home

## 2018-11-28 NOTE — ASSESSMENT & PLAN NOTE
· New onset for patient  CTA PE study negative for PE however positive for 6cm lower lobe mass, suspicious for pneumonia vs neoplasm vs (less likely) non-imaged subsegmental PE; no prior lung imaging available for review  LE venous duplex pending, however informal read reportedly negative for DVT      · Hold ASA at this time  · Has received azithromycin/ceftriaxone at this time  · Given risk factors for malignancy will consult Pulmonlogy to assess need/timing of further workup  · Defer systemic anticoagulation given active hemoptysis, lack of clear evidence for DVT/PE

## 2018-11-28 NOTE — ANESTHESIA PREPROCEDURE EVALUATION
Review of Systems/Medical History  Patient summary reviewed  Chart reviewed  No history of anesthetic complications     Cardiovascular  Hyperlipidemia, Hypertension ,    Pulmonary  Smoker (1 ppd) , Sleep apnea CPAP,   Comment: RLL mass/Hemoptysis x 4 days before going to ED/ Prior had chronic cough for months, Since hospitalized, hemoptysis daily--Working dx includes TB (Spoke with Pulmonologist who indicated that there is genuine risk for active TB)     GI/Hepatic  Negative GI/hepatic ROS               Endo/Other  Diabetes type 2 Oral agent,      GYN  Negative gynecology ROS          Hematology   Musculoskeletal  Sciatica (Right),        Neurology  Negative neurology ROS      Psychology   Negative psychology ROS              Physical Exam    Airway  Comment: Has mask on-cannot assess airway           Dental   Comment: Missing fillings,     Cardiovascular  Rhythm: regular,     Pulmonary  Breath sounds clear to auscultation,     Other Findings        Anesthesia Plan  ASA Score- 4     Anesthesia Type- IV sedation with anesthesia with ASA Monitors  Additional Monitors:   Airway Plan:     Comment: Plan includes appropriate precautions for TB risk        Plan Factors-    Induction- intravenous  Postoperative Plan-     Informed Consent- Anesthetic plan and risks discussed with patient  I personally reviewed this patient with the CRNA  Discussed and agreed on the Anesthesia Plan with the CRNA  Juan Pablo Hickey

## 2018-11-28 NOTE — PLAN OF CARE
DISCHARGE PLANNING     Discharge to home or other facility with appropriate resources Progressing        DISCHARGE PLANNING - CARE MANAGEMENT     Discharge to post-acute care or home with appropriate resources Progressing        Knowledge Deficit     Patient/family/caregiver demonstrates understanding of disease process, treatment plan, medications, and discharge instructions Progressing        PAIN - ADULT     Verbalizes/displays adequate comfort level or baseline comfort level Progressing        SAFETY ADULT     Maintain or return to baseline ADL function Progressing

## 2018-11-28 NOTE — ASSESSMENT & PLAN NOTE
6cm RLL mass noted on CTA PE study, no prior lung imaging studies available  · Pulmonology consultation as above

## 2018-11-28 NOTE — CONSULTS
Consultation - Pulmonary Medicine   Kirstin Enriquez 60 y o  male MRN: [id number]  Unit/Bed#: [facility] 615-01 Encounter: [id number]      Assessment/Plan:    1  Hemoptysis secondary to community-acquired pneumonia vs malignancy vs TB      *  patient living in high risk congregate settings      *  reported 15 lb weight loss, patient attributes night sweats to his CPAP      *  placed on airborne precaution until AFB negative post bronch      *  procalcitonin negative, WBC unremarkable, Legionella/pneumonia negative, Flu & sputum- pending, afebrile       *  patient antibiotics currently ceftriaxone/ azithromycin day #2 per primary team    2  Abnormal CTA of the chest      *  6 cm RLL cavitas lesion, bilateral emphysematous changes      *  Patient will need bronchoscopy 11/29/18, 11am      *  please keep NPO at midnight      *  the would benefit from PFTs outpatient follow-up      *  increased bilateral leg swelling, will need BNP    3  Smoking cessation       *  nicotine replacement therapy managed by primary team       *  educated on smoking cessation         4  JUNITO       *  continue CPAP- 15    Will follow up with Pulmonary outpatient per discharge instructions, will need repeat imaging in 4-6 weeks      History of Present Illness   Physician Requesting Consult: Bill Carias MD  Reason for Consult / Principal Problem:  Hemoptysis  Hx and PE limited by:  Nothing  Chief Complaint:  "I have been coughing up blood for 3 days"  HPI: Kirstin Enriquez is a 60 y o  male who presented to [place] [address] with complaints of increased coughing with hemoptysis  Patient has a history of diabetes, HLD, , HTN, sciatica, JUNITO, and left vocal cord paralysis  The patient reports approximately 2 months ago he moved out of his house and moved into the [facility]  He recalls that this is when a moist productive cough began  Patient reports many sick contacts at this location throughout his 21 day stay    Patient reports he then moved to [facility] in which many of the house cast that live there also have productive coughs  Patient stated that the hemoptysis began approximately 3 days ago  He describes his sputum as dark red blood with mild clots  Patient also stated that he has noticed an increase in leg swelling over the past 2-3 weeks, in which the left is greater than the right  Patient reports a 15 lb weight loss over the last 5 months after making conscious efforts to eat healthier  Patient currently denies chest pain, shortness of breath, night sweats, dyspnea on exertion, fever, chills, or headache  From a pulmonary standpoint, patient has a 40 year, 2 pack-a-day smoking history, with intermittent months of quitting  Patient reports he has been smoking 2 packs for the last 4 months due to stress  Patient also reports a 2 year vaping that contained 3% nicotine history  Patient denies any illicit drug use  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 [employer]  Patient stated he had PFTs almost 10 years ago, in [place], in which he stated he was diagnosed with mild COPD, however was never started on any inhalers/nebulizer treatments  We are currently in the process of attempting to locate these records  Patient normally sees Dr Reyna Bee from [facility] pulmonary  He was diagnosed with JUNITO, titrate study in 02/2017  Patient reports he is compliant with his CPAP, setting at 15  Patient also stated approximately 20 years ago he was 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  Patient will need pulmonary follow-up and PFTs      Inpatient consult to Pulmonology  Consult performed by: Martha Foster  Consult ordered by: Tierra Back          Review of Systems   Constitutional: Negative for activity change, appetite change and chills  HENT: Negative for congestion and postnasal drip  Respiratory: Positive for cough   Negative for apnea, choking, chest tightness, shortness of breath, wheezing and stridor  Cardiovascular: Positive for leg swelling  Negative for chest pain and palpitations  Gastrointestinal: Negative for abdominal distention and abdominal pain  Genitourinary: Negative for difficulty urinating and enuresis  Musculoskeletal: Negative for back pain and gait problem  Skin: Negative for color change and pallor  Neurological: Negative for dizziness and facial asymmetry  Psychiatric/Behavioral: Negative for agitation and behavioral problems  Historical Information   Past Medical History:   Diagnosis Date    Diabetes mellitus ([address] )     Hyperlipidemia     Hypertension     Sciatica      Past Surgical History:   Procedure Laterality Date    REPAIR KNEE LIGAMENT Left     ROTATOR CUFF REPAIR Right      Social History   History   Alcohol Use No     History   Drug Use No     History   Smoking Status    Current Every Day Smoker    Packs/day: 2 00    Years: 30 00    Types: Cigarettes   Smokeless Tobacco    Never Used     Occupational History:      Family History:   Family History   Problem Relation Age of Onset    Lung cancer Father        Meds/Allergies   pertinent pulmonary meds have been reviewed    No Known Allergies    Objective   Vitals: Blood pressure 144/78, pulse 57, temperature 97 5 °F (36 4 °C), temperature source Oral, resp  rate 18, height 6' 4" (1 93 m), SpO2 96 %  RA,There is no height or weight on file to calculate BMI  Intake/Output Summary (Last 24 hours) at 11/28/18 0900  Last data filed at 11/28/18 0645   Gross per 24 hour   Intake              100 ml   Output              500 ml   Net             -400 ml     Invasive Devices     Peripheral Intravenous Line            Peripheral IV 11/27/18 Right Antecubital less than 1 day                Physical Exam   Constitutional: He is oriented to person, place, and time  He appears well-developed and well-nourished     HENT:   Head: Normocephalic and atraumatic  Neck: Normal range of motion  Neck supple  No JVD present  No tracheal deviation present  Cardiovascular: Normal rate, regular rhythm and normal heart sounds  Exam reveals no gallop and no friction rub  No murmur heard  Pulmonary/Chest: Effort normal  No accessory muscle usage or stridor  No respiratory distress  He has no decreased breath sounds  He has no wheezes  He has no rhonchi  He has rales in the right lower field  He exhibits no tenderness  Patient has frequent productive cough with mild dark red hemoptysis   Abdominal: Soft  Bowel sounds are normal  He exhibits no distension  There is no tenderness  Musculoskeletal: Normal range of motion  He exhibits edema  Trace RLE   Neurological: He is alert and oriented to person, place, and time  Skin: Skin is warm and dry  Psychiatric: He has a normal mood and affect  His behavior is normal        Lab Results:   I have personally reviewed pertinent lab results  , ABG: No results found for: PHART, MVW4AAI, PO2ART, JDP3YMZ, S4BIVLWS, BEART, SOURCE, BNP: No results found for: BNP, CBC:   Lab Results   Component Value Date    WBC 6 03 11/27/2018    HGB 14 2 11/27/2018    HCT 43 7 11/27/2018    MCV 93 11/27/2018     11/27/2018    MCH 30 3 11/27/2018    MCHC 32 5 11/27/2018    RDW 13 1 11/27/2018    MPV 9 5 11/27/2018    NRBC 0 11/27/2018   , CMP:   Lab Results   Component Value Date    SODIUM 138 11/28/2018    K 3 9 11/28/2018     (H) 11/28/2018    CO2 23 11/28/2018    BUN 11 11/28/2018    CREATININE 0 74 11/28/2018    CALCIUM 9 2 11/28/2018    AST 18 11/27/2018    ALT 24 11/27/2018    ALKPHOS 67 11/27/2018    EGFR 117 11/28/2018   , PT/INR:   Lab Results   Component Value Date    INR 0 97 11/27/2018   , Troponin:   Lab Results   Component Value Date    TROPONINI <0 02 11/27/2018       Imaging Studies: I have personally reviewed pertinent films in PACS     CTA- 11/27/2018- bilateral emphysematous changes, right lower lobe consolidation measuring approximately 6 mm, 2 cm pancreatic head lesion    EKG, Pathology, and Other Studies: I have personally reviewed pertinent films in PACS     EKG 11/27/2018-normal sinus rhythm    Pulmonary Results (PFTs, PSG): I have personally reviewed pertinent films in PACS    No PFTs on record    VTE Prophylaxis: Reason for no pharmacologic prophylaxis ASA on hold for hemoptysis, currently pending B/L REESE cr    Code Status: Level 1 - Full Code    None    Portions of the record may have been created with voice recognition software  Occasional wrong word or "sound a like" substitutions may have occurred due to the inherent limitations of voice recognition software  Read the chart carefully and recognize, using context, where substitutions have occurred

## 2018-11-28 NOTE — ASSESSMENT & PLAN NOTE
Lab Results   Component Value Date    HGBA1C 6 1 11/28/2018     Recent Labs      11/28/18   0006  11/28/18   0836   POCGLU  76  89     Blood Sugar Average: Last 72 hrs:  (P) 82 5     · Diabetes is well controlled on metformin which is on hold during hospitalization  Resume at discharge  · Continue Accu-Cheks and sliding scale insulin

## 2018-11-28 NOTE — PROGRESS NOTES
Progress Note - Kathy Trevizo 1960, 60 y o  male MRN: [id number]  Unit/Bed#: [facility] 187-38 Encounter: [id number]  Primary Care Provider: Alejandrina Rivas MD   Date and time admitted to hospital: 11/27/2018  3:37 PM    * Hemoptysis   Assessment & Plan    · New onset for patient  Patient is high risk for malignancy given his history of tobacco abuse  · CTA PE study negative for PE however positive for 6cm lower lobe mass, suspicious for pneumonia vs neoplasm vs (less likely) non-imaged subsegmental PE; no prior lung imaging available for review  · Pulmonary consult currently in progress  Anticipate patient will need bronchoscopy  · ASA on hold at this time in anticipation for bronchoscopy with biopsy and bronchial washings  · Has received azithromycin/ceftriaxone in ED  · Strep pneumonia and Legionella are negative  · Procalcitonin is negative  · Will monitor off antibiotics  · Influenza PCR:  Pending     Pancreatic abnormality   Assessment & Plan    · CT of chest demonstrates a 2 cm low attenuating lesion in the pancreatic head  · Would obtain CT abdomen/pelvis for further definition of these findings and also to evaluate for other findings in the abdomen  Essential hypertension   Assessment & Plan    · Elevated on admission but improved now  · Continue metoprolol, lisinopril     Right lower lobe lung mass   Assessment & Plan    · CT chest demonstrates right lower lobe consolidation measuring 6 cm  · See plan under hemoptysis  JUNITO (obstructive sleep apnea)   Assessment & Plan    · Patient utilizes CPAP daily during sleep hours  Type 2 diabetes mellitus without complication [facility])   Assessment & Plan    Lab Results   Component Value Date    HGBA1C 6 1 11/28/2018     Recent Labs      11/28/18   0006  11/28/18   0836   POCGLU  76  89     Blood Sugar Average: Last 72 hrs:  (P) 82 5     · Diabetes is well controlled on metformin which is on hold during hospitalization    Resume at discharge  · Continue Accu-Cheks and sliding scale insulin  Tobacco use disorder   Assessment & Plan    · Current smoker  · Smoking cessation counseling, nicotine patch       VTE Pharmacologic Prophylaxis:   Pharmacologic: Pharmacologic VTE Prophylaxis contraindicated due to hemoptysis  Mechanical: Mechanical VTE prophylaxis in place  Patient Centered Rounds: I have performed bedside rounds with nursing staff today  Discussions with Specialists or Other Care Team Provider: Pulmonary  Education and Discussions with Family / Patient: All patient questions answered to the best of my ability  Time Spent for Care: 20 minutes  More than 50% of total time spent on counseling and coordination of care as described above  Current Length of Stay: 0 day(s)  Current Patient Status: Observation   Certification Statement: The patient, admitted on an observation basis, will now require > 2 midnight hospital stay due to need for bronchoscopy  Discharge Plan: Patient is not medically stable for discharge  Code Status: Level 1 - Full Code    Subjective:   Patient continues to have hemoptysis in basin sitting at bedside  Otherwise, patient is feeling fine without complaints  Objective:   Vitals:   Temp (24hrs), Av 7 °F (36 5 °C), Min:97 5 °F (36 4 °C), Max:98 °F (36 7 °C)    Temp:  [97 5 °F (36 4 °C)-98 °F (36 7 °C)] 97 5 °F (36 4 °C)  HR:  [57-87] 57  Resp:  [16-22] 18  BP: (144-178)/() 144/78  SpO2:  [94 %-97 %] 96 %  There is no height or weight on file to calculate BMI  Input and Output Summary (last 24 hours): Intake/Output Summary (Last 24 hours) at 18 1046  Last data filed at 18 0645   Gross per 24 hour   Intake              100 ml   Output              500 ml   Net             -400 ml       Physical Exam:     Physical Exam   HENT:   Head: Normocephalic and atraumatic  Mouth/Throat: Oropharynx is clear and moist and mucous membranes are normal    Eyes: No scleral icterus  Cardiovascular: Normal rate and regular rhythm  No murmur heard  Pulmonary/Chest: Breath sounds normal  He has no wheezes  He has no rales  He exhibits no tenderness  Abdominal: Soft  Bowel sounds are normal  He exhibits no distension  There is no tenderness  Musculoskeletal: Normal range of motion  He exhibits no edema  Skin: Skin is warm and dry  No rash noted  Psychiatric: He has a normal mood and affect  Vitals reviewed  Additional Data:   Labs:    Results from last 7 days  Lab Units 11/27/18  1629   WBC Thousand/uL 6 03   HEMOGLOBIN g/dL 14 2   HEMATOCRIT % 43 7   PLATELETS Thousands/uL 275   NEUTROS PCT % 46   LYMPHS PCT % 40   MONOS PCT % 9   EOS PCT % 3       Results from last 7 days  Lab Units 11/28/18  0707 11/27/18  1629   POTASSIUM mmol/L 3 9 3 8   CHLORIDE mmol/L 110* 109*   CO2 mmol/L 23 25   BUN mg/dL 11 16   CREATININE mg/dL 0 74 1 01   CALCIUM mg/dL 9 2 9 6   ALK PHOS U/L  --  67   ALT U/L  --  24   AST U/L  --  18       Results from last 7 days  Lab Units 11/27/18  1629   INR  0 97       * I Have Reviewed All Lab Data Listed Above  * Additional Pertinent Lab Tests Reviewed:  All Labs Within Last 24 Hours Reviewed    Imaging:    Imaging Reports Reviewed Today Include: none new    Cultures:   Blood Culture: No results found for: BLOODCX  Urine Culture: No results found for: URINECX  Sputum Culture: No components found for: SPUTUMCX  Wound Culture: No results found for: WOUNDCULT    Last 24 Hours Medication List:     Current Facility-Administered Medications:  albuterol 2 puff Inhalation Q4H PRN Miriam Tellez MD   aluminum-magnesium hydroxide-simethicone 30 mL Oral Q6H PRN Shea Ac MD   atorvastatin 40 mg Oral Daily With Pedro Mendez MD   cefTRIAXone 1,000 mg Intravenous Q24H Shea Ac MD   And       azithromycin 500 mg Intravenous Q24H Shea Ac MD   insulin lispro 1-5 Units Subcutaneous HS Shea Ac MD   insulin lispro 1-6 Units Subcutaneous TID AC Sami Dorman MD   lisinopril 40 mg Oral Daily Sami Dorman MD   metoprolol succinate 50 mg Oral Daily Sami Dorman MD   nicotine 1 patch Transdermal Daily Sami Dorman MD        Today, Patient Was Seen By: Mallory Kaplan PA-C    ** Please Note: Dragon 360 Dictation voice to text software may have been used in the creation of this document   **

## 2018-11-28 NOTE — ED NOTES
Pt asked for food   I offered a turkey sandwich and pt refused     William Burkett RN  11/27/18 2001

## 2018-11-28 NOTE — UTILIZATION REVIEW
Initial Clinical Review    [address] [facility]  Utilization Review Department  Phone: [phone]; Fax [phone]  [email or web address]  ATTENTION: Please call with any questions or concerns to [phone]  and carefully listen to the prompts so that you are directed to the right person  Send all requests for admission clinical reviews, approved or denied determinations and any other requests to fax [phone]  All voicemails are confidential     Admission: Date/Time/Statement:  Placed in Observation status on 11/27 @ 20:33 Changed to Inpatient status  On 11/28 @     11/28/18 1521  Inpatient Admission     Transfer Service: General Medicine       Question Answer   Admitting Physician Jie Wetzel   Level of Care Med Surg   Estimated length of stay More than 2 Midnights   Certification I certify that inpatient services are medically necessary for this patient for a duration of greater than two midnights  See H&P and MD Progress Notes for additional information about the patient's course of treatment  ED: Date/Time/Mode of Arrival:   ED Arrival Information     Expected Arrival Acuity Means of Arrival Escorted By Service Admission Type    - 11/27/2018 14:58 Urgent Walk-In Self Hospitalist Urgent    Arrival Complaint    Coughing Up Blood          Chief Complaint:   Chief Complaint   Patient presents with    Medical Problem     Pt reports coughing up blood x2days  Pt denies SOB, cp       History of Illness: Miguel Singh is a 60 y o  male who has past medical history significant for hypertension, DM2, and tobacco abuse of approximately 60 pack years  He presents today with complaint of productive cough of 2 weeks duration, with new onset hemoptysis for the past 2 days  He describes the hemoptysis as red, mixed with normal sputum, with some clots    Complains of new onset leg swelling approximately 2-3 days, left leg somewhat greater than right, without rash or pain  15 lb weight loss in the past 2 weeks  Takes ASA daily  Reports no personal or family history of DVT/PE, but reports history of lung cancer in father  He notes continued hemoptysis since presentation  ED Vital Signs:   ED Triage Vitals   Temperature Pulse Respirations Blood Pressure SpO2   11/27/18 1508 11/27/18 1508 11/27/18 1508 11/27/18 1508 11/27/18 1508   98 °F (36 7 °C) 87 16 167/91 94 %      Temp Source Heart Rate Source Patient Position - Orthostatic VS BP Location FiO2 (%)   11/27/18 1508 11/27/18 1508 11/27/18 1630 11/27/18 1508 --   Oral Monitor Lying Left arm       Pain Score       11/27/18 2002       No Pain        Wt Readings from Last 1 Encounters:   No data found for Wt       Vital Signs (abnormal):         11/27 0701  11/28 0700  Most Recent    Temperature (°F) 97  598  97 5 (36 4)    Pulse 6087  64    Respirations 1622  18    Blood Pressure 158/93178/114  165/104    SpO2 (%) 9497  97          Abnormal Labs/Diagnostic Test Results:  111/27 - Cl 109 - Albumin 3 4 - Trop 0 02 - H/H 14 2/43  7- Procalcitonin 0 05    CTA Chest  - Bilateral lung emphysematous changes predominantly right upper lobe  Right lower lobe consolidation measuring approximately 6 cm possibly related to pneumonia  2 cm low attenuating lesion in the pancreatic head, correlate with nonemergent outpatient contrast enhanced abdominal MRI or CT recommended        VAS LE 's -     No dvt   ED Treatment:   Medication Administration from 11/27/2018 1458 to 11/27/2018 2221       Date/Time Order Dose Route Action     11/27/2018 1737 iohexol (OMNIPAQUE) 350 MG/ML injection (MULTI-DOSE) 85 mL 85 mL Intravenous Given     11/27/2018 1847 ceftriaxone (ROCEPHIN) 1 g/50 mL in dextrose IVPB 1,000 mg Intravenous New Bag     11/27/2018 1957 azithromycin (ZITHROMAX) 500 mg in sodium chloride 0 9% 250mL IVPB 500 mg 500 mg Intravenous New Bag     11/27/2018 2044 metoprolol tartrate (LOPRESSOR) tablet 25 mg 25 mg Oral Given 11/27/2018 2130 famotidine (PEPCID) tablet 20 mg 20 mg Oral Given     11/27/2018 2130 aluminum-magnesium hydroxide-simethicone (MYLANTA) 200-200-20 mg/5 mL oral suspension 15 mL 15 mL Oral Given       Past Medical/Surgical History:   Diagnosis    Diabetes mellitus ([facility] [address] )    Hyperlipidemia    Hypertension    Sciatica     Procedure Laterality    REPAIR KNEE LIGAMENT Left    ROTATOR CUFF REPAIR Right         Admitting Diagnosis: Coughing up blood [R04 2]  Abnormal CT scan [R93 89]  Productive cough [R05]  Right lower lobe lung mass [R91 8]  Hemoptysis [R66 0]  Elevated systolic blood pressure reading with diagnosis of hypertension [I10]    Age/Sex: 60 y o  male    Assessment/Plan:  61 yo m presents with hemoptysis -  New onset - ct showed a  6 cm lower lobe mass ( Pulmonary consult ) - hold ASA   LE duplex -   pt with essential HTN  ( Lopressor  & lisinopril) -   Hyperlipidemia - cont statin - JUNITO on CPAP hs - DM2 with out complications - monitor -  Smoking cessation  Counseling - nicotine patch     Admission Orders:  Scheduled Meds:   Current Facility-Administered Medications:  albuterol 2 puff Inhalation Q4H PRN   aluminum-magnesium hydroxide-simethicone 30 mL Oral Q6H PRN   atorvastatin 40 mg Oral Daily With Dinner   cefTRIAXone 1,000 mg Intravenous Q24H   And      azithromycin 500 mg Intravenous Q24H   insulin lispro 1-5 Units Subcutaneous HS   insulin lispro 1-6 Units Subcutaneous TID AC   lisinopril 40 mg Oral Daily   metoprolol succinate 50 mg Oral Daily   nicotine 1 patch Transdermal Daily      Nursing Orders - VS q 4 - up & OOB as tolerated - Aspiration precautions - incentive spirometry - diet cons carb - CPAP hs - respiratory isolation  R/o TB     Pulmonary consult - The patient has a history of cough which progressed to hemoptysis over the past 3-4 days    He does have history of 15 lb weight loss which was intentional   He has recently been homeless and living in the [facility] and shelters with other people that her coughing  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[employer] and is a lifelong smoker up to 2 packs per day  After review of his labs which are completely unremarkable, with a normal procalcitonin, no fever trend, his CT scan does reveal a right lower lobe consolidation which is cavitary in nature  On the differential for this would be 1  Bacterial pneumonia albeit unusual with no fever or white cell count elevation, aspiration, tuberculosis, and bronchogenic lung cancer  In the best that she is a patient I think we should place him on respiratory isolation and do a fiberoptic bronchoscopy in a m  To rule out pulmonary tuberculosis    In the meantime continue his Rocephin, stop Zithromax and add Flagyl

## 2018-11-28 NOTE — ASSESSMENT & PLAN NOTE
· CT of chest demonstrates a 2 cm low attenuating lesion in the pancreatic head  · Would obtain CT abdomen/pelvis for further definition of these findings and also to evaluate for other findings in the abdomen

## 2018-11-29 ENCOUNTER — ANESTHESIA (INPATIENT)
Dept: GASTROENTEROLOGY | Facility: HOSPITAL | Age: 58
DRG: 139 | End: 2018-11-29
Payer: COMMERCIAL

## 2018-11-29 LAB
ANION GAP SERPL CALCULATED.3IONS-SCNC: 5 MMOL/L (ref 4–13)
BUN SERPL-MCNC: 11 MG/DL (ref 5–25)
CALCIUM SERPL-MCNC: 8.3 MG/DL (ref 8.3–10.1)
CHLORIDE SERPL-SCNC: 113 MMOL/L (ref 100–108)
CO2 SERPL-SCNC: 24 MMOL/L (ref 21–32)
CREAT SERPL-MCNC: 0.84 MG/DL (ref 0.6–1.3)
GFR SERPL CREATININE-BSD FRML MDRD: 111 ML/MIN/1.73SQ M
GLUCOSE SERPL-MCNC: 105 MG/DL (ref 65–140)
GLUCOSE SERPL-MCNC: 118 MG/DL (ref 65–140)
GLUCOSE SERPL-MCNC: 82 MG/DL (ref 65–140)
GLUCOSE SERPL-MCNC: 96 MG/DL (ref 65–140)
GLUCOSE SERPL-MCNC: 99 MG/DL (ref 65–140)
POTASSIUM SERPL-SCNC: 4 MMOL/L (ref 3.5–5.3)
SODIUM SERPL-SCNC: 142 MMOL/L (ref 136–145)

## 2018-11-29 PROCEDURE — 99232 SBSQ HOSP IP/OBS MODERATE 35: CPT | Performed by: INTERNAL MEDICINE

## 2018-11-29 PROCEDURE — 88112 CYTOPATH CELL ENHANCE TECH: CPT | Performed by: PATHOLOGY

## 2018-11-29 PROCEDURE — 82948 REAGENT STRIP/BLOOD GLUCOSE: CPT

## 2018-11-29 PROCEDURE — 87116 MYCOBACTERIA CULTURE: CPT | Performed by: INTERNAL MEDICINE

## 2018-11-29 PROCEDURE — 94760 N-INVAS EAR/PLS OXIMETRY 1: CPT

## 2018-11-29 PROCEDURE — 87015 SPECIMEN INFECT AGNT CONCNTJ: CPT | Performed by: INTERNAL MEDICINE

## 2018-11-29 PROCEDURE — 87070 CULTURE OTHR SPECIMN AEROBIC: CPT | Performed by: INTERNAL MEDICINE

## 2018-11-29 PROCEDURE — 0B9F8ZX DRAINAGE OF RIGHT LOWER LUNG LOBE, VIA NATURAL OR ARTIFICIAL OPENING ENDOSCOPIC, DIAGNOSTIC: ICD-10-PCS | Performed by: INTERNAL MEDICINE

## 2018-11-29 PROCEDURE — 87206 SMEAR FLUORESCENT/ACID STAI: CPT | Performed by: INTERNAL MEDICINE

## 2018-11-29 PROCEDURE — 80048 BASIC METABOLIC PNL TOTAL CA: CPT | Performed by: PHYSICIAN ASSISTANT

## 2018-11-29 PROCEDURE — 87102 FUNGUS ISOLATION CULTURE: CPT | Performed by: INTERNAL MEDICINE

## 2018-11-29 PROCEDURE — 88305 TISSUE EXAM BY PATHOLOGIST: CPT | Performed by: PATHOLOGY

## 2018-11-29 PROCEDURE — 87281 PNEUMOCYSTIS CARINII AG IF: CPT | Performed by: INTERNAL MEDICINE

## 2018-11-29 PROCEDURE — 94660 CPAP INITIATION&MGMT: CPT

## 2018-11-29 PROCEDURE — 31624 DX BRONCHOSCOPE/LAVAGE: CPT | Performed by: INTERNAL MEDICINE

## 2018-11-29 RX ORDER — LIDOCAINE HYDROCHLORIDE 10 MG/ML
INJECTION, SOLUTION INFILTRATION; PERINEURAL AS NEEDED
Status: DISCONTINUED | OUTPATIENT
Start: 2018-11-29 | End: 2018-11-29 | Stop reason: SURG

## 2018-11-29 RX ORDER — PROPOFOL 10 MG/ML
INJECTION, EMULSION INTRAVENOUS AS NEEDED
Status: DISCONTINUED | OUTPATIENT
Start: 2018-11-29 | End: 2018-11-29 | Stop reason: SURG

## 2018-11-29 RX ORDER — GLYCOPYRROLATE 0.2 MG/ML
INJECTION INTRAMUSCULAR; INTRAVENOUS AS NEEDED
Status: DISCONTINUED | OUTPATIENT
Start: 2018-11-29 | End: 2018-11-29 | Stop reason: SURG

## 2018-11-29 RX ORDER — SODIUM CHLORIDE 9 MG/ML
INJECTION, SOLUTION INTRAVENOUS CONTINUOUS PRN
Status: DISCONTINUED | OUTPATIENT
Start: 2018-11-29 | End: 2018-11-29 | Stop reason: SURG

## 2018-11-29 RX ADMIN — PROPOFOL 20 MG: 10 INJECTION, EMULSION INTRAVENOUS at 11:41

## 2018-11-29 RX ADMIN — CEFTRIAXONE 1000 MG: 1 INJECTION, POWDER, FOR SOLUTION INTRAMUSCULAR; INTRAVENOUS at 17:26

## 2018-11-29 RX ADMIN — PROPOFOL 20 MG: 10 INJECTION, EMULSION INTRAVENOUS at 11:42

## 2018-11-29 RX ADMIN — LIDOCAINE HYDROCHLORIDE 50 MG: 10 INJECTION, SOLUTION INFILTRATION; PERINEURAL at 11:37

## 2018-11-29 RX ADMIN — ATORVASTATIN CALCIUM 40 MG: 40 TABLET, FILM COATED ORAL at 17:26

## 2018-11-29 RX ADMIN — AZITHROMYCIN MONOHYDRATE 500 MG: 500 INJECTION, POWDER, LYOPHILIZED, FOR SOLUTION INTRAVENOUS at 18:16

## 2018-11-29 RX ADMIN — PROPOFOL 30 MG: 10 INJECTION, EMULSION INTRAVENOUS at 11:39

## 2018-11-29 RX ADMIN — GLYCOPYRROLATE 2 MG: 0.2 INJECTION, SOLUTION INTRAMUSCULAR; INTRAVENOUS at 11:37

## 2018-11-29 RX ADMIN — SODIUM CHLORIDE: 0.9 INJECTION, SOLUTION INTRAVENOUS at 11:32

## 2018-11-29 RX ADMIN — PROPOFOL 20 MG: 10 INJECTION, EMULSION INTRAVENOUS at 11:43

## 2018-11-29 RX ADMIN — PROPOFOL 70 MG: 10 INJECTION, EMULSION INTRAVENOUS at 11:37

## 2018-11-29 RX ADMIN — LISINOPRIL 40 MG: 20 TABLET ORAL at 13:07

## 2018-11-29 RX ADMIN — METOPROLOL SUCCINATE 50 MG: 50 TABLET, EXTENDED RELEASE ORAL at 13:07

## 2018-11-29 NOTE — PROCEDURES
Bronchoscopy Procedure Note    Date of Operation: 11/29/2018    Pre-op Diagnosis:  Pneumonia    Post-op Diagnosis:  Pneumonia    Surgeon: Suraj Limon DO    Assistants:  None    Anesthesia:  Conscious sedation    Operation: Flexible fiberoptic bronchoscopy, diagnostic Bal    Findings:  Patient had severe upper airway collapse due to obstructive sleep apnea  His epiglottis was enlarged but not pathologically  He had a great deal of redundant tissue causing airway collapse  Once into the trachea appeared normal   His yoana was sharp  Airway was examined up to the 2nd subsegmental levels with no evidence of endobronchial tumor  There was bloody mucus coming out of his right lower lobe which was lavaged and sent for culture and cytology  Specimen: Bloody bronchial lavage 60 mL    Estimated Blood Loss: Minimal    Drains:  None    Complications:  None    Indications and History:  The patient is a 60 y o  male with hemoptysis  The risks, benefits, complications, treatment options and expected outcomes were discussed with the patient  The possibilities of reaction to medication, pulmonary aspiration, perforation of a viscus, bleeding, failure to diagnose a condition and creating a complication requiring transfusion or operation were discussed with the patient who freely signed the consent  Description of Procedure: The patient was seen in the Holding Room and the site of surgery properly noted/marked  The patient was taken to endoscopy suite, identified as [facility] and the procedure verified as Flexible Fiberoptic Bronchoscopy  A Time Out was held and the above information confirmed  After the induction of topical nasopharyngeal anesthesia, the patient was positioned supine and the bronchoscope was passed through the nares   The vocal cords were visualized and  1% buffered lidocaine 5 ml was topically placed onto the cords  The cords were normal  The scope was then passed into the trachea    1% buffered lidocaine 5 ml was used topically on the yoana  Careful inspection of the tracheal lumen was accomplished  The scope was sequentially passed into the left main and then left upper and lower bronchi and segmental bronchi  The scope was then withdrawn and advanced into the right main bronchus and then into the RUL, RML, and RLL bronchi and segmental bronchi  Wash was done and there was bloody return specimen  Endobronchial findings:  Normal  Trachea: Normal mucosa  Yoana: Normal mucosa  Right main bronchus: Normal mucosa  Right upper lobe bronchus: Normal mucosa  Right upper lobe bronchus: Normal mucosa  Right upper lobe bronchus: Normal mucosa  Left main bronchus: Normal mucosa  Left upper lobe bronchus: Normal mucosa  Left lower lobe bronchus: Normal mucosa    The Patient was taken to the Endoscopy Recovery area in satisfactory condition      Attestation: I was present for the entire procedure    Beatrice Bae DO

## 2018-11-29 NOTE — ANESTHESIA POSTPROCEDURE EVALUATION
Post-Op Assessment Note      CV Status:  Stable    Mental Status:  Alert and awake    Hydration Status:  Euvolemic    PONV Controlled:  Controlled    Airway Patency:  Patent    Post Op Vitals Reviewed: Yes          Staff: Anesthesiologist, CRNA       Comments: transported back to inpatient room by RN; wearing N95 nask          /93 (11/29/18 1200)    Temp      Pulse 88 (11/29/18 1200)   Resp      SpO2 97 % (11/29/18 1200)

## 2018-11-30 LAB
BACTERIA SPT RESP CULT: NORMAL
GLUCOSE SERPL-MCNC: 102 MG/DL (ref 65–140)
GLUCOSE SERPL-MCNC: 108 MG/DL (ref 65–140)
GLUCOSE SERPL-MCNC: 90 MG/DL (ref 65–140)
GRAM STN SPEC: NORMAL
P JIROVECII AG SPEC QL IF: NORMAL

## 2018-11-30 PROCEDURE — 82948 REAGENT STRIP/BLOOD GLUCOSE: CPT

## 2018-11-30 PROCEDURE — 99232 SBSQ HOSP IP/OBS MODERATE 35: CPT | Performed by: INTERNAL MEDICINE

## 2018-11-30 PROCEDURE — 87389 HIV-1 AG W/HIV-1&-2 AB AG IA: CPT | Performed by: INTERNAL MEDICINE

## 2018-11-30 RX ADMIN — CEFTRIAXONE 1000 MG: 1 INJECTION, POWDER, FOR SOLUTION INTRAMUSCULAR; INTRAVENOUS at 17:33

## 2018-11-30 RX ADMIN — METRONIDAZOLE 500 MG: 500 INJECTION, SOLUTION INTRAVENOUS at 15:27

## 2018-11-30 RX ADMIN — ATORVASTATIN CALCIUM 40 MG: 40 TABLET, FILM COATED ORAL at 17:33

## 2018-11-30 RX ADMIN — METOPROLOL SUCCINATE 50 MG: 50 TABLET, EXTENDED RELEASE ORAL at 09:04

## 2018-11-30 RX ADMIN — LISINOPRIL 40 MG: 20 TABLET ORAL at 09:04

## 2018-11-30 NOTE — ASSESSMENT & PLAN NOTE
Lab Results   Component Value Date    HGBA1C 6 1 11/28/2018     Recent Labs      11/29/18   1225  11/29/18   1653  11/29/18   2118  11/30/18   1223   POCGLU  82  105  118  90     Blood Sugar Average: Last 72 hrs:  (P) 95 1     · Diabetes is well controlled on metformin which is on hold during hospitalization  Resume at discharge  · Continue Accu-Cheks and sliding scale insulin    · Glucose is adequately controlled, continue current management

## 2018-11-30 NOTE — PROGRESS NOTES
Progress Note - Alessia Galvan 1960, 60 y o  male MRN: [id number]    Unit/Bed#: [facility] 620-01 Encounter: [id number]    Primary Care Provider: Jovani Matthews MD   Date and time admitted to hospital: 11/27/2018  3:37 PM        * Hemoptysis   Assessment & Plan    · New onset for patient  Patient is high risk for malignancy given his history of tobacco abuse  · CTA PE study negative for PE however positive for 6cm lower lobe mass, suspicious for pneumonia vs neoplasm vs (less likely) non-imaged subsegmental PE; no prior lung imaging available for review  · ASA on hold at this time  · Has received azithromycin/ceftriaxone in ED  · Strep pneumonia and Legionella are negative  · Influenza PCR:  Negative  · Continue broad-spectrum antibiotics  · Await BAL studies, cultures positive awaiting speciation and sensitivities, AFB is negative discontinue respiratory isolation  · Check HIV status, pending     Pancreatic abnormality   Assessment & Plan    · Awaiting MRI of abdomen to further characterize     Essential hypertension   Assessment & Plan    · Blood pressure is acceptable  · Continue metoprolol, lisinopril     Right lower lobe lung mass   Assessment & Plan    · As above     Hyperlipidemia   Assessment & Plan    · Chronic, will continue statin     JUNITO (obstructive sleep apnea)   Assessment & Plan    · Patient utilizes CPAP daily during sleep hours  Type 2 diabetes mellitus without complication [facility])   Assessment & Plan    Lab Results   Component Value Date    HGBA1C 6 1 11/28/2018     Recent Labs      11/29/18   1225  11/29/18   1653  11/29/18   2118  11/30/18   1223   POCGLU  82  105  118  90     Blood Sugar Average: Last 72 hrs:  (P) 95 1     · Diabetes is well controlled on metformin which is on hold during hospitalization  Resume at discharge  · Continue Accu-Cheks and sliding scale insulin    · Glucose is adequately controlled, continue current management           VTE Pharmacologic Prophylaxis:   Pharmacologic: Pharmacologic VTE Prophylaxis contraindicated due to Hemoptysis  Mechanical VTE Prophylaxis in Place: Yes    Patient Centered Rounds: I have performed bedside rounds with nursing staff today  Discussions with Specialists or Other Care Team Provider:  Pulmonology    Education and Discussions with Family / Patient:  Patient, plan of care    Time Spent for Care: 20 minutes  More than 50% of total time spent on counseling and coordination of care as described above  Current Length of Stay: 2 day(s)    Current Patient Status: Inpatient   Certification Statement: The patient will continue to require additional inpatient hospital stay due to Awaiting BAL cultures, IV antibiotics, MRI of abdomen    Discharge Plan:  Home when stable    Code Status: Level 1 - Full Code      Subjective:   Reports continued cough with a scant amount of bloody sputum  Denies shortness of breath, fever, chills, diarrhea  Objective:     Vitals:   Temp (24hrs), Av 1 °F (36 7 °C), Min:97 16 °F (36 2 °C), Max:99 °F (37 2 °C)    Temp:  [97 16 °F (36 2 °C)-99 °F (37 2 °C)] 97 16 °F (36 2 °C)  HR:  [55-65] 55  Resp:  [20] 20  BP: (145-150)/(83-89) 150/83  SpO2:  [94 %-97 %] 97 %  There is no height or weight on file to calculate BMI  Input and Output Summary (last 24 hours): Intake/Output Summary (Last 24 hours) at 18 1713  Last data filed at 18 1600   Gross per 24 hour   Intake             1690 ml   Output             1200 ml   Net              490 ml       Physical Exam:     Physical Exam   Constitutional: He is oriented to person, place, and time  He appears well-developed and well-nourished  HENT:   Head: Normocephalic and atraumatic  Eyes: Pupils are equal, round, and reactive to light  EOM are normal    Neck: Neck supple  No JVD present  Cardiovascular: Normal rate and regular rhythm  Pulmonary/Chest: Effort normal  He has no wheezes  He has no rales     Musculoskeletal: Normal range of motion  He exhibits no edema  Neurological: He is alert and oriented to person, place, and time  No cranial nerve deficit  Skin: Skin is warm  Additional Data:     Labs:      Results from last 7 days  Lab Units 11/27/18  1629   WBC Thousand/uL 6 03   HEMOGLOBIN g/dL 14 2   HEMATOCRIT % 43 7   PLATELETS Thousands/uL 275   NEUTROS PCT % 46   LYMPHS PCT % 40   MONOS PCT % 9   EOS PCT % 3       Results from last 7 days  Lab Units 11/29/18  0550  11/27/18  1629   SODIUM mmol/L 142  < > 140   POTASSIUM mmol/L 4 0  < > 3 8   CHLORIDE mmol/L 113*  < > 109*   CO2 mmol/L 24  < > 25   BUN mg/dL 11  < > 16   CREATININE mg/dL 0 84  < > 1 01   ANION GAP mmol/L 5  < > 6   CALCIUM mg/dL 8 3  < > 9 6   ALBUMIN g/dL  --   --  3 4*   TOTAL BILIRUBIN mg/dL  --   --  0 44   ALK PHOS U/L  --   --  67   ALT U/L  --   --  24   AST U/L  --   --  18   GLUCOSE RANDOM mg/dL 96  < > 90   < > = values in this interval not displayed  Results from last 7 days  Lab Units 11/27/18  1629   INR  0 97       Results from last 7 days  Lab Units 11/30/18  1223 11/29/18  2118 11/29/18  1653 11/29/18  1225 11/29/18  0812 11/28/18  2131 11/28/18  1715 11/28/18  1248 11/28/18  0836 11/28/18  0006   POC GLUCOSE mg/dl 90 118 105 82 99 112 92 88 89 76       Results from last 7 days  Lab Units 11/28/18  0707   HEMOGLOBIN A1C % 6 1       Results from last 7 days  Lab Units 11/27/18  2241   PROCALCITONIN ng/ml <0 05           * I Have Reviewed All Lab Data Listed Above  * Additional Pertinent Lab Tests Reviewed:  All Labs Within Last 24 Hours Reviewed      Recent Cultures (last 7 days):       Results from last 7 days  Lab Units 11/29/18  1146 11/27/18  2311 11/27/18  2305 11/27/18  2304   SPUTUM CULTURE   --   --  2+ Growth of   --    GRAM STAIN RESULT  Rare Polys  Rare Gram positive cocci in pairs  --  1+ Epithelial cells per low power field  No Polys  Rare Gram negative rods  --    INFLUENZA B PCR   --  None Detected  --   -- RSV PCR   --  None Detected  --   --    LEGIONELLA URINARY ANTIGEN   --   --   --  Negative       Last 24 Hours Medication List:     Current Facility-Administered Medications:  albuterol 2 puff Inhalation Q4H PRN Alvarado Becerril MD    aluminum-magnesium hydroxide-simethicone 30 mL Oral Q6H PRN Joaquin Jeffries MD    atorvastatin 40 mg Oral Daily With Allen Randolph MD    cefTRIAXone 1,000 mg Intravenous Q24H Joaquin Jeffries MD Last Rate: Stopped (11/29/18 1826)   insulin lispro 1-5 Units Subcutaneous HS Joaquin Jeffries MD    insulin lispro 1-6 Units Subcutaneous TID AC Joaquin Jeffries MD    lisinopril 40 mg Oral Daily Joaquin Jeffries MD    metoprolol succinate 50 mg Oral Daily Joaquin Jeffries MD    metroNIDAZOLE 500 mg Intravenous Q8H Lorene Sims PA-C Last Rate: 500 mg (11/30/18 1527)   nicotine 1 patch Transdermal Daily Joaquin Jeffries MD         Today, Patient Was Seen By: Lois Johnson MD    ** Please Note: Dictation voice to text software may have been used in the creation of this document   **

## 2018-11-30 NOTE — ASSESSMENT & PLAN NOTE
· New onset for patient  Patient is high risk for malignancy given his history of tobacco abuse  · CTA PE study negative for PE however positive for 6cm lower lobe mass, suspicious for pneumonia vs neoplasm vs (less likely) non-imaged subsegmental PE; no prior lung imaging available for review  · ASA on hold at this time  · Has received azithromycin/ceftriaxone in ED  · Strep pneumonia and Legionella are negative    · Influenza PCR:  Negative  · Continue broad-spectrum antibiotics  · Await BAL studies, cultures positive awaiting speciation and sensitivities, AFB is negative discontinue respiratory isolation  · Check HIV status, pending

## 2018-11-30 NOTE — ASSESSMENT & PLAN NOTE
Lab Results   Component Value Date    HGBA1C 6 1 11/28/2018     Recent Labs      11/28/18   1715  11/28/18   2131  11/29/18   0812  11/29/18   1225   POCGLU  92  112  99  82     Blood Sugar Average: Last 72 hrs:  (P) 91 [id number]     · Diabetes is well controlled on metformin which is on hold during hospitalization  Resume at discharge  · Continue Accu-Cheks and sliding scale insulin    · Glucose is adequately controlled, continue current management

## 2018-11-30 NOTE — ASSESSMENT & PLAN NOTE
· New onset for patient  Patient is high risk for malignancy given his history of tobacco abuse  · CTA PE study negative for PE however positive for 6cm lower lobe mass, suspicious for pneumonia vs neoplasm vs (less likely) non-imaged subsegmental PE; no prior lung imaging available for review  · Pulmonary consult currently in progress  Anticipate patient will need bronchoscopy  · ASA on hold at this time in anticipation for bronchoscopy with biopsy and bronchial washings  · Has received azithromycin/ceftriaxone in ED  · Strep pneumonia and Legionella are negative  · Procalcitonin is negative  · Will monitor off antibiotics    · Influenza PCR:  Negative  · Continue broad-spectrum antibiotics  · Await BAL studies  · Maintain respiratory isolation  · Check HIV status

## 2018-11-30 NOTE — PROGRESS NOTES
Progress Note - Pulmonary   Glorious Zoe 60 y o  male MRN: [id number]  Unit/Bed#: [facility] 620-01 Encounter: [id number]    Assessment/Plan:    1  Hemoptysis secondary to community-acquired pneumonia vs malignancy vs TB      *  patient living in high risk congregate settings, HIV panel pending      *  reported 15 lb weight loss, patient attributes night sweats to his CPAP      *  placed on airborne precaution until AFB negative post bronch      *  procalcitonin negative, WBC unremarkable, Legionella/pneumonia negative, Flu & sputum- pending, afebrile       *  patient antibiotics currently ceftriaxone day #3, will start flagyl day#1 per Dr Pradeep Small     2  Abnormal CTA of the chest      *  6 cm RLL cavitas lesion, bilateral emphysematous changes, negative for PE, BNP-62      *  S/P bronchoscopy- cultures pending      *  would benefit from pulmonary follow-up     3  Smoking cessation       *  nicotine replacement therapy managed by primary team       *  educated on smoking cessation         4  JUNITO       *  continue CPAP- 15     Will follow up with Pulmonary outpatient per discharge instructions, will need repeat imaging in 4-6 weeks    Chief Complaint:    "I want to get to my car to get my phone "    Subjective:    Patient was sitting on the side of his bed and appeared agitated and frustrated  Patient reported that he is extremely concerned that he may have lung or pancreatic cancer  Patient expressed that he just wants to know the result so he can begin his treatment whether be cancer or TB  The patient was also frustrated that he was on airborne precautions and expressed he was feeling rather claustrophobic  Patient continues to report hemoptysis, however he reports the hemoptysis is decreasing in size and frequency  The patient did state that he feels his sputum is bright red today    Patient continues to denies fever, chills, shortness of breath, chest pain, wheezing, nausea, vomiting, diarrhea, headache, night, weight loss, and headache  Objective:    Vitals: Blood pressure 145/89, pulse 65, temperature 99 °F (37 2 °C), resp  rate 20, height 6' 4" (1 93 m), SpO2 94 %  RA,There is no height or weight on file to calculate BMI  Intake/Output Summary (Last 24 hours) at 11/30/18 1127  Last data filed at 11/30/18 0930   Gross per 24 hour   Intake             1840 ml   Output              875 ml   Net              965 ml       Invasive Devices     Peripheral Intravenous Line            Peripheral IV 11/27/18 Right Antecubital 2 days                Physical Exam:   Physical Exam   Constitutional: He is oriented to person, place, and time  He appears well-developed and well-nourished  HENT:   Head: Normocephalic and atraumatic  Neck: Normal range of motion  Neck supple  Cardiovascular: Normal rate and regular rhythm  Pulmonary/Chest: Effort normal and breath sounds normal  No accessory muscle usage  No tachypnea and no bradypnea  No respiratory distress  He has no decreased breath sounds  He has no wheezes  He has no rhonchi  He has no rales  He exhibits no tenderness  Abdominal: Soft  Bowel sounds are normal    Musculoskeletal: Normal range of motion  He exhibits no edema or deformity  Neurological: He is alert and oriented to person, place, and time  Skin: Skin is warm and dry  Psychiatric: He has a normal mood and affect  His behavior is normal        Labs:  I have personally reviewed pertinent lab results from 11/29/2018      Imaging and other studies: I have personally reviewed pertinent films in PACS     CTA- 11/27/2018- bilateral emphysematous changes, right lower lobe consolidation measuring approximately 6 mm, 2 cm pancreatic head lesion

## 2018-11-30 NOTE — PROGRESS NOTES
Progress Note - Rocio Liset 1960, 60 y o  male MRN: [id number]    Unit/Bed#: [facility] 620-01 Encounter: [id number]    Primary Care Provider: Miriam Akins MD   Date and time admitted to hospital: 11/27/2018  3:37 PM        * Hemoptysis   Assessment & Plan    · New onset for patient  Patient is high risk for malignancy given his history of tobacco abuse  · CTA PE study negative for PE however positive for 6cm lower lobe mass, suspicious for pneumonia vs neoplasm vs (less likely) non-imaged subsegmental PE; no prior lung imaging available for review  · Pulmonary consult currently in progress  Anticipate patient will need bronchoscopy  · ASA on hold at this time in anticipation for bronchoscopy with biopsy and bronchial washings  · Has received azithromycin/ceftriaxone in ED  · Strep pneumonia and Legionella are negative  · Procalcitonin is negative  · Will monitor off antibiotics  · Influenza PCR:  Negative  · Continue broad-spectrum antibiotics  · Await BAL studies  · Maintain respiratory isolation  · Check HIV status     Pancreatic abnormality   Assessment & Plan    · Obtain MRI of abdomen to further characterize     Essential hypertension   Assessment & Plan    · Blood pressure is acceptable  · Continue metoprolol, lisinopril     Right lower lobe lung mass   Assessment & Plan    · As above     Hyperlipidemia   Assessment & Plan    · Chronic, will continue statin     JUNITO (obstructive sleep apnea)   Assessment & Plan    · Patient utilizes CPAP daily during sleep hours  Type 2 diabetes mellitus without complication [facility])   Assessment & Plan    Lab Results   Component Value Date    HGBA1C 6 1 11/28/2018     Recent Labs      11/28/18   1715  11/28/18   2131  11/29/18   0812  11/29/18   1225   POCGLU  92  112  99  82     Blood Sugar Average: Last 72 hrs:  (P) 91 [id number]     · Diabetes is well controlled on metformin which is on hold during hospitalization    Resume at discharge  · Continue Accu-Cheks and sliding scale insulin  · Glucose is adequately controlled, continue current management           VTE Pharmacologic Prophylaxis:   Pharmacologic: Pharmacologic VTE Prophylaxis contraindicated due to Hemoptysis  Mechanical VTE Prophylaxis in Place: Yes    Patient Centered Rounds: I have performed bedside rounds with nursing staff today  Discussions with Specialists or Other Care Team Provider:  Pulmonology    Education and Discussions with Family / Patient:  Patient, plan of care    Time Spent for Care: 15 minutes  More than 50% of total time spent on counseling and coordination of care as described above  Current Length of Stay: 1 day(s)    Current Patient Status: Inpatient   Certification Statement: The patient will continue to require additional inpatient hospital stay due to Awaiting cultures, IV antibiotic treatment    Discharge Plan:  Home when stable    Code Status: Level 1 - Full Code      Subjective:   Reports that his cough has significantly improved since his bronchoscopy, denies any significant shortness of breath  Objective:     Vitals:   Temp (24hrs), Av 4 °F (36 9 °C), Min:97 9 °F (36 6 °C), Max:98 78 °F (37 1 °C)    Temp:  [97 9 °F (36 6 °C)-98 78 °F (37 1 °C)] 98 78 °F (37 1 °C)  HR:  [57-92] 67  Resp:  [20-21] 20  BP: (131-158)/(77-99) 143/90  SpO2:  [93 %-98 %] 95 %  There is no height or weight on file to calculate BMI  Input and Output Summary (last 24 hours): Intake/Output Summary (Last 24 hours) at 18 1935  Last data filed at 18 1806   Gross per 24 hour   Intake              630 ml   Output             1075 ml   Net             -445 ml       Physical Exam:     Physical Exam   Constitutional: He is oriented to person, place, and time  He appears well-developed and well-nourished  HENT:   Head: Normocephalic and atraumatic  Mouth/Throat: No oropharyngeal exudate  Eyes: Pupils are equal, round, and reactive to light  EOM are normal  No scleral icterus  Neck: Neck supple  No JVD present  Cardiovascular: Normal rate and regular rhythm  Pulmonary/Chest: Effort normal  He has no wheezes  He has no rales  Abdominal: Soft  There is no tenderness  Musculoskeletal: Normal range of motion  He exhibits no edema  Neurological: He is alert and oriented to person, place, and time  Skin: Skin is warm and dry  Additional Data:     Labs:      Results from last 7 days  Lab Units 11/27/18  1629   WBC Thousand/uL 6 03   HEMOGLOBIN g/dL 14 2   HEMATOCRIT % 43 7   PLATELETS Thousands/uL 275   NEUTROS PCT % 46   LYMPHS PCT % 40   MONOS PCT % 9   EOS PCT % 3       Results from last 7 days  Lab Units 11/29/18  0550  11/27/18  1629   SODIUM mmol/L 142  < > 140   POTASSIUM mmol/L 4 0  < > 3 8   CHLORIDE mmol/L 113*  < > 109*   CO2 mmol/L 24  < > 25   BUN mg/dL 11  < > 16   CREATININE mg/dL 0 84  < > 1 01   ANION GAP mmol/L 5  < > 6   CALCIUM mg/dL 8 3  < > 9 6   ALBUMIN g/dL  --   --  3 4*   TOTAL BILIRUBIN mg/dL  --   --  0 44   ALK PHOS U/L  --   --  67   ALT U/L  --   --  24   AST U/L  --   --  18   GLUCOSE RANDOM mg/dL 96  < > 90   < > = values in this interval not displayed  Results from last 7 days  Lab Units 11/27/18  1629   INR  0 97       Results from last 7 days  Lab Units 11/29/18  1225 11/29/18  0812 11/28/18  2131 11/28/18  1715 11/28/18  1248 11/28/18  0836 11/28/18  0006   POC GLUCOSE mg/dl 82 99 112 92 88 89 76       Results from last 7 days  Lab Units 11/28/18  0707   HEMOGLOBIN A1C % 6 1       Results from last 7 days  Lab Units 11/27/18  2241   PROCALCITONIN ng/ml <0 05           * I Have Reviewed All Lab Data Listed Above  * Additional Pertinent Lab Tests Reviewed:  All Labs Within Last 24 Hours Reviewed    Imaging:    Imaging Reports Reviewed Today Include:  CT scan      Recent Cultures (last 7 days):       Results from last 7 days  Lab Units 11/29/18  1146 11/27/18  2311 11/27/18  2305 11/27/18  2304 SPUTUM CULTURE   --   --  2+ Growth of   --    GRAM STAIN RESULT  Rare Polys  Rare Gram positive cocci in pairs  --  1+ Epithelial cells per low power field  No Polys  Rare Gram negative rods  --    INFLUENZA B PCR   --  None Detected  --   --    RSV PCR   --  None Detected  --   --    LEGIONELLA URINARY ANTIGEN   --   --   --  Negative       Last 24 Hours Medication List:     Current Facility-Administered Medications:  albuterol 2 puff Inhalation Q4H PRN Eduin Cabezas MD    aluminum-magnesium hydroxide-simethicone 30 mL Oral Q6H PRN Roland Rodriguez MD    atorvastatin 40 mg Oral Daily With Lord Heydi MD    cefTRIAXone 1,000 mg Intravenous Q24H Roland Rodriguez MD Last Rate: 1,000 mg (11/29/18 1726)   And        azithromycin 500 mg Intravenous Q24H Roland Rodriguez MD Last Rate: 500 mg (11/29/18 1816)   insulin lispro 1-5 Units Subcutaneous HS Roland Rodriguez MD    insulin lispro 1-6 Units Subcutaneous TID AC Roland Rodriguez MD    lisinopril 40 mg Oral Daily Roland Rodriguez MD    metoprolol succinate 50 mg Oral Daily Roland Rodriguez MD    nicotine 1 patch Transdermal Daily Roland Rodriguez MD         Today, Patient Was Seen By: Lisandro Castrejon MD    ** Please Note: Dictation voice to text software may have been used in the creation of this document   **

## 2018-12-01 ENCOUNTER — APPOINTMENT (INPATIENT)
Dept: RADIOLOGY | Facility: HOSPITAL | Age: 58
DRG: 139 | End: 2018-12-01
Payer: COMMERCIAL

## 2018-12-01 VITALS
DIASTOLIC BLOOD PRESSURE: 85 MMHG | TEMPERATURE: 98.1 F | HEIGHT: 76 IN | OXYGEN SATURATION: 96 % | BODY MASS INDEX: 33.83 KG/M2 | RESPIRATION RATE: 20 BRPM | WEIGHT: 277.78 LBS | SYSTOLIC BLOOD PRESSURE: 153 MMHG | HEART RATE: 58 BPM

## 2018-12-01 LAB
BACTERIA BRONCH AEROBE CULT: NORMAL
GLUCOSE SERPL-MCNC: 100 MG/DL (ref 65–140)
GLUCOSE SERPL-MCNC: 102 MG/DL (ref 65–140)
GLUCOSE SERPL-MCNC: 94 MG/DL (ref 65–140)
GLUCOSE SERPL-MCNC: 96 MG/DL (ref 65–140)
GRAM STN SPEC: NORMAL
GRAM STN SPEC: NORMAL

## 2018-12-01 PROCEDURE — 99239 HOSP IP/OBS DSCHRG MGMT >30: CPT | Performed by: INTERNAL MEDICINE

## 2018-12-01 PROCEDURE — 94660 CPAP INITIATION&MGMT: CPT

## 2018-12-01 PROCEDURE — 94760 N-INVAS EAR/PLS OXIMETRY 1: CPT

## 2018-12-01 PROCEDURE — 82948 REAGENT STRIP/BLOOD GLUCOSE: CPT

## 2018-12-01 PROCEDURE — 74183 MRI ABD W/O CNTR FLWD CNTR: CPT

## 2018-12-01 PROCEDURE — A9585 GADOBUTROL INJECTION: HCPCS | Performed by: INTERNAL MEDICINE

## 2018-12-01 RX ORDER — CEFDINIR 300 MG/1
300 CAPSULE ORAL EVERY 12 HOURS SCHEDULED
Status: DISCONTINUED | OUTPATIENT
Start: 2018-12-01 | End: 2018-12-01 | Stop reason: HOSPADM

## 2018-12-01 RX ORDER — METRONIDAZOLE 500 MG/1
500 TABLET ORAL EVERY 8 HOURS SCHEDULED
Status: DISCONTINUED | OUTPATIENT
Start: 2018-12-01 | End: 2018-12-01 | Stop reason: HOSPADM

## 2018-12-01 RX ORDER — METRONIDAZOLE 250 MG/1
500 TABLET ORAL EVERY 8 HOURS SCHEDULED
Qty: 12 TABLET | Refills: 0 | Status: SHIPPED | OUTPATIENT
Start: 2018-12-01 | End: 2018-12-04 | Stop reason: HOSPADM

## 2018-12-01 RX ORDER — ALBUTEROL SULFATE 90 UG/1
2 AEROSOL, METERED RESPIRATORY (INHALATION) EVERY 4 HOURS PRN
Qty: 1 INHALER | Refills: 0 | Status: SHIPPED | OUTPATIENT
Start: 2018-12-01

## 2018-12-01 RX ORDER — CEFDINIR 300 MG/1
300 CAPSULE ORAL EVERY 12 HOURS SCHEDULED
Qty: 8 CAPSULE | Refills: 0 | Status: SHIPPED | OUTPATIENT
Start: 2018-12-01 | End: 2018-12-04 | Stop reason: HOSPADM

## 2018-12-01 RX ADMIN — METRONIDAZOLE 500 MG: 500 TABLET ORAL at 17:27

## 2018-12-01 RX ADMIN — ATORVASTATIN CALCIUM 40 MG: 40 TABLET, FILM COATED ORAL at 16:25

## 2018-12-01 RX ADMIN — LISINOPRIL 40 MG: 20 TABLET ORAL at 08:47

## 2018-12-01 RX ADMIN — CEFDINIR 300 MG: 300 CAPSULE ORAL at 17:55

## 2018-12-01 RX ADMIN — METOPROLOL SUCCINATE 50 MG: 50 TABLET, EXTENDED RELEASE ORAL at 08:47

## 2018-12-01 RX ADMIN — GADOBUTROL 13 ML: 604.72 INJECTION INTRAVENOUS at 15:54

## 2018-12-01 RX ADMIN — METRONIDAZOLE 500 MG: 500 INJECTION, SOLUTION INTRAVENOUS at 00:03

## 2018-12-01 RX ADMIN — METRONIDAZOLE 500 MG: 500 INJECTION, SOLUTION INTRAVENOUS at 08:51

## 2018-12-01 NOTE — ASSESSMENT & PLAN NOTE
Lab Results   Component Value Date    HGBA1C 6 1 11/28/2018     Recent Labs      11/30/18   1658  11/30/18   2058  12/01/18   0804  12/01/18   1200   POCGLU  108  102  94  102     Blood Sugar Average: Last 72 hrs:  (P) 63 [id number]     C/w metformin

## 2018-12-01 NOTE — DISCHARGE SUMMARY
Discharge- Bisi Santamaria 1960, 60 y o  male MRN: [id number]    Unit/Bed#: [facility] 620-01 Encounter: [id number]    Primary Care Provider: Paris Velasquez MD   Date and time admitted to hospital: 11/27/2018  3:37 PM        Pancreatic abnormality   Assessment & Plan    Awaiting MRI of abdomen to further characterize abdominal mass  Discussed with radiologist on the phone  Lesion on pancreas is most likely cyst    Will need outpatient follow up with repeat imaging in 6 months and possible GI referral         Essential hypertension   Assessment & Plan    Blood pressure is acceptable  BP is 153/85  Continue metoprolol, lisinopril     Right lower lobe lung mass   Assessment & Plan    As above  Repeat imaging in 4-6 weeks and close pulmonology follow up for cytology  JUNITO (obstructive sleep apnea)   Assessment & Plan    Patient utilizes CPAP daily during sleep hours  C/w this at home  Type 2 diabetes mellitus without complication [facility])   Assessment & Plan    Lab Results   Component Value Date    HGBA1C 6 1 11/28/2018     Recent Labs      11/30/18   1658  11/30/18   2058  12/01/18   0804  12/01/18   1200   POCGLU  108  102  94  102     Blood Sugar Average: Last 72 hrs:  (P) 47 [id number]     C/w metformin  Tobacco use disorder   Assessment & Plan    Current smoker  Smoking cessation counseling, nicotine patch while in hospital    Not quitting  * Hemoptysis   Assessment & Plan    New onset for patient  Patient is high risk for malignancy given his history of tobacco abuse  CTA PE study negative for PE however positive for 6cm lower lobe mass, suspicious for pneumonia vs neoplasm vs (less likely) non-imaged subsegmental PE; no prior lung imaging available for review  ASA on hold at this time  Has received azithromycin/ceftriaxone in ED  Strep pneumonia and Legionella are negative  Influenza PCR:  Negative      Reviewed Pulmonology note   Stable for DC from their POV provided IRVIN negative which it is so far today  DC on total of 7 days of omnicef and metronidazole  Will need repeat CT in 4-6 weeks as well as close pulmonology follow up for cytology  Discussed with patient - hemoptysis improved  No episodes today  Discharging Physician / Practitioner: Ben Lerner MD  PCP: Chritsi Galindo MD  Admission Date:   Admission Orders     Ordered        11/28/18 1520  Inpatient Admission  Once         11/27/18 2033  Place in Observation (expected length of stay for this patient is less than two midnights)  Once             Discharge Date: 12/01/18    Resolved Problems  Date Reviewed: 12/1/2018    None          Consultations During Hospital Stay:  · Pulmonology - Dr Alix Goldsmith  Procedures Performed:     · CT CAP -   · "Study is inadequate to characterize the pancreatic lesion as it was not performed without any contrast   Again, recommendation is for appropriate pancreatic protocol CT or MRI   I suspect it is probably a cyst     There are also indeterminate renal and hepatic low density lesions which may also be cysts  [place] can probably be better characterized on the follow-up pancreatic protocol study  There is colonic diverticulosis  There is right lower lobe pneumonia "    MRI abdomen -report pending at time of DC  Significant Findings / Test Results:     · As above  Incidental Findings:   · As above  Test Results Pending at Discharge (will require follow up): · Report of MRI  Outpatient Tests Requested:  · None  Complications:  None  Reason for Admission:   Hemoptysis  Hospital Course:     Elvi Becerra is a 60 y o  male patient who originally presented to the hospital on 11/27/2018 due to him up this is    The patient was seen by pulmonology in treated for community-acquired pneumonia with Rocephin and metronidazole  This was transitioned to SALAS NIDHI and metronidazole on the day of discharge      Given the patient's history of smoking and findings on the CT scan there was concern about an underlying lying malignancy as his right lower lobe consolidation which looks cavitary in nature  AFB testing was negative and so the patient was advised to complete a 7 day course of antibiotics and will need close pulmonary follow up with pulmonology  Cytology will need to be followed up with the pulmonology office and the patient will need to follow up with his primary care physician with these results to determine further treatment course  The patient did have an incidental finding of the pancreatic cystic lesion which was further investigated with an MRI  At the time of discharge this had not been formally reported however I did speak with the radiologist who did review the slides with me and verbally conveyed to me that this is most in keeping with a cystic lesion, but that the patient will need repeat imaging in 6 months time and should follow up with his primary care physician to probably have some liver function tests done as well  At this time the patient is eager to be discharged home and did not have any pressing inpatient needs  He is saturating well on room air and uses CPAP when sleeping at night which he already has set up at home  His hemoptysis has resolved/improved and he knows to follow up with pulmonology and his primary care physician P    Please see above list of diagnoses and related plan for additional information       Condition at Discharge: stable     Discharge Day Visit / Exam:     Subjective:      Patient seen and examined      " I feel great"    " really want to go home "  Vitals: Blood Pressure: 153/85 (12/01/18 1650)  Pulse: 58 (12/01/18 1650)  Temperature: 98 1 °F (36 7 °C) (12/01/18 1650)  Temp Source: Tympanic (11/29/18 1029)  Respirations: 20 (12/01/18 1650)  Height: 6' 4" (193 cm) (11/27/18 1508)  Weight - Scale: 126 kg (277 lb 12 5 oz) (11/27/18 1508)  SpO2: 96 % (12/01/18 1650)  Exam: Physical Exam   Constitutional: He is oriented to person, place, and time  No distress  HENT:   Head: Normocephalic and atraumatic  Mouth/Throat: No oropharyngeal exudate  Eyes: Right eye exhibits no discharge  Left eye exhibits no discharge  No scleral icterus  Neck: Normal range of motion  No JVD present  No tracheal deviation present  No thyromegaly present  Cardiovascular: Normal rate  Exam reveals no gallop and no friction rub  No murmur heard  Pulmonary/Chest: Effort normal  No respiratory distress  He has no wheezes  He has no rales  He exhibits no tenderness  Crackles right base  Abdominal: Soft  He exhibits no distension and no mass  There is no tenderness  There is no rebound and no guarding  Musculoskeletal: He exhibits no edema, tenderness or deformity  Lymphadenopathy:     He has no cervical adenopathy  Neurological: He is alert and oriented to person, place, and time  He displays normal reflexes  No cranial nerve deficit  He exhibits normal muscle tone  Coordination normal    Skin: Skin is warm  No rash noted  He is not diaphoretic  No erythema  No pallor  Psychiatric: He has a normal mood and affect  Discussion with Family: Discussed with patient and he will update family/friends  Discharge instructions/Information to patient and family:   See after visit summary for information provided to patient and family  Provisions for Follow-Up Care:  See after visit summary for information related to follow-up care and any pertinent home health orders  Disposition:     Home    For Discharges to [facility] SNF:   · Not Applicable to this Patient - Not Applicable to this Patient    Planned Readmission: none  Discharge Statement:  I spent 45 minutes discharging the patient  This time was spent on the day of discharge  I had direct contact with the patient on the day of discharge   Greater than 50% of the total time was spent examining patient, answering all patient questions, arranging and discussing plan of care with patient as well as directly providing post-discharge instructions  Additional time then spent on discharge activities  Discharge Medications:  See after visit summary for reconciled discharge medications provided to patient and family        ** Please Note: This note has been constructed using a voice recognition system **

## 2018-12-01 NOTE — PLAN OF CARE
DISCHARGE PLANNING     Discharge to home or other facility with appropriate resources Adequate for Discharge        DISCHARGE PLANNING - CARE MANAGEMENT     Discharge to post-acute care or home with appropriate resources Adequate for Discharge        Knowledge Deficit     Patient/family/caregiver demonstrates understanding of disease process, treatment plan, medications, and discharge instructions Adequate for Discharge        PAIN - ADULT     Verbalizes/displays adequate comfort level or baseline comfort level Adequate for Discharge        SAFETY ADULT     Maintain or return to baseline ADL function Adequate for Discharge

## 2018-12-01 NOTE — ASSESSMENT & PLAN NOTE
New onset for patient  Patient is high risk for malignancy given his history of tobacco abuse  CTA PE study negative for PE however positive for 6cm lower lobe mass, suspicious for pneumonia vs neoplasm vs (less likely) non-imaged subsegmental PE; no prior lung imaging available for review  ASA on hold at this time  Has received azithromycin/ceftriaxone in ED  Strep pneumonia and Legionella are negative  Influenza PCR:  Negative      Reviewed Pulmonology note  Stable for DC from their POV provided IRVIN negative which it is so far today  DC on total of 7 days of omnicef and metronidazole  Will need repeat CT in 4-6 weeks as well as close pulmonology follow up for cytology  Discussed with patient - hemoptysis improved  No episodes today

## 2018-12-01 NOTE — PROGRESS NOTES
Progress Note - Krishna Cotto 1960, 60 y o  male MRN: [id number]    Unit/Bed#: [facility] 620-01 Encounter: [id number]    Primary Care Provider: Ludin Horner MD   Date and time admitted to hospital: 11/27/2018  3:37 PM        Pancreatic abnormality   Assessment & Plan    Awaiting MRI of abdomen to further characterize abdominal mass  Called MRI  No answer  Essential hypertension   Assessment & Plan    Blood pressure is acceptable  BP is 140/80  Continue metoprolol, lisinopril     Right lower lobe lung mass   Assessment & Plan    As above  Repeat imaging in 4-6 weeks and close pulmonology follow up for cytology  Hyperlipidemia   Assessment & Plan    Chronic, will continue statin     JUNITO (obstructive sleep apnea)   Assessment & Plan    Patient utilizes CPAP daily during sleep hours  C/w this at home  Type 2 diabetes mellitus without complication [facility])   Assessment & Plan    Lab Results   Component Value Date    HGBA1C 6 1 11/28/2018     Recent Labs      11/30/18   1658  11/30/18   2058  12/01/18   0804  12/01/18   1200   POCGLU  108  102  94  102     Blood Sugar Average: Last 72 hrs:  (P) 77 [id number]     C/w metformin  Tobacco use disorder   Assessment & Plan    Current smoker  Smoking cessation counseling, nicotine patch while in hospital    Not quitting  * Hemoptysis   Assessment & Plan    New onset for patient  Patient is high risk for malignancy given his history of tobacco abuse  CTA PE study negative for PE however positive for 6cm lower lobe mass, suspicious for pneumonia vs neoplasm vs (less likely) non-imaged subsegmental PE; no prior lung imaging available for review  ASA on hold at this time  Has received azithromycin/ceftriaxone in ED  Strep pneumonia and Legionella are negative  Influenza PCR:  Negative      Reviewed Pulmonology note  Stable for DC from their POV provided IRVIN negative which it is so far today     DC on total of 7 days of omnicef and metronidazole  Will need repeat CT in 4-6 weeks as well as close pulmonology follow up for cytology  Discussed with patient - hemoptysis improved  No episodes today  VTE Pharmacologic Prophylaxis:   Pharmacologic: Heparin  Mechanical VTE Prophylaxis in Place: Yes    Patient Centered Rounds: I have performed bedside rounds with nursing staff today  Discussions with Specialists or Other Care Team Provider: Discussed with nursing  Education and Discussions with Family / Patient:     Time Spent for Care: 30 minutes  More than 50% of total time spent on counseling and coordination of care as described above  Current Length of Stay: 3 day(s)    Current Patient Status: Inpatient   Certification Statement: The patient will continue to require additional inpatient hospital stay due to awaitng MRI abdomen  Discharge Plan: stable from pulmonary stand point, however MRI ordered  Left message with MRI to see if this can be done today  Code Status: Level 1 - Full Code      Subjective:   Patient seen and examined      " I feel okay"    Breathing is "Better" Hemoptysis is "better"    Objective:     Vitals:   Temp (24hrs), Av 3 °F (36 3 °C), Min:97 16 °F (36 2 °C), Max:97 5 °F (36 4 °C)    Temp:  [97 16 °F (36 2 °C)-97 5 °F (36 4 °C)] 97 5 °F (36 4 °C)  HR:  [50-57] 57  Resp:  [18-20] 20  BP: (140-150)/(80-83) 140/80  SpO2:  [95 %-99 %] 95 %  Body mass index is 33 81 kg/m²  Input and Output Summary (last 24 hours): Intake/Output Summary (Last 24 hours) at 18 1233  Last data filed at 18 0700   Gross per 24 hour   Intake             1490 ml   Output             1125 ml   Net              365 ml       Physical Exam:     Physical Exam   Constitutional: He appears well-developed  No distress  HENT:   Head: Normocephalic and atraumatic  Mouth/Throat: No oropharyngeal exudate  Eyes: Right eye exhibits no discharge  Left eye exhibits no discharge   No scleral icterus  Neck: Normal range of motion  No JVD present  No tracheal deviation present  No thyromegaly present  Cardiovascular: Normal rate  Exam reveals no gallop  No murmur heard  Pulmonary/Chest: Effort normal  No respiratory distress  He has no wheezes  He has no rales  He exhibits no tenderness  Abdominal: Soft  He exhibits no distension and no mass  There is no tenderness  There is no rebound and no guarding  Musculoskeletal: He exhibits no edema, tenderness or deformity  Lymphadenopathy:     He has no cervical adenopathy  Neurological: He is alert  He displays normal reflexes  No cranial nerve deficit  He exhibits normal muscle tone  Coordination normal    Skin: Skin is warm  No rash noted  He is not diaphoretic  No erythema  No pallor  Psychiatric: He has a normal mood and affect  Additional Data:     Labs:      Results from last 7 days  Lab Units 11/27/18  1629   WBC Thousand/uL 6 03   HEMOGLOBIN g/dL 14 2   HEMATOCRIT % 43 7   PLATELETS Thousands/uL 275   NEUTROS PCT % 46   LYMPHS PCT % 40   MONOS PCT % 9   EOS PCT % 3       Results from last 7 days  Lab Units 11/29/18  0550  11/27/18  1629   SODIUM mmol/L 142  < > 140   POTASSIUM mmol/L 4 0  < > 3 8   CHLORIDE mmol/L 113*  < > 109*   CO2 mmol/L 24  < > 25   BUN mg/dL 11  < > 16   CREATININE mg/dL 0 84  < > 1 01   ANION GAP mmol/L 5  < > 6   CALCIUM mg/dL 8 3  < > 9 6   ALBUMIN g/dL  --   --  3 4*   TOTAL BILIRUBIN mg/dL  --   --  0 44   ALK PHOS U/L  --   --  67   ALT U/L  --   --  24   AST U/L  --   --  18   GLUCOSE RANDOM mg/dL 96  < > 90   < > = values in this interval not displayed      Results from last 7 days  Lab Units 11/27/18  1629   INR  0 97       Results from last 7 days  Lab Units 12/01/18  1200 12/01/18  0804 11/30/18  2058 11/30/18  1658 11/30/18  1223 11/30/18  0817 11/29/18  2118 11/29/18  1653 11/29/18  1225 11/29/18  0812 11/28/18  2131 11/28/18  1715   POC GLUCOSE mg/dl 102 94 102 108 90 100 118 105 82 99 112 92 Results from last 7 days  Lab Units 11/28/18  0707   HEMOGLOBIN A1C % 6 1       Results from last 7 days  Lab Units 11/27/18  2241   PROCALCITONIN ng/ml <0 05           * I Have Reviewed All Lab Data Listed Above  * Additional Pertinent Lab Tests Reviewed: Sakina 65 Admission Reviewed    Imaging:    Imaging Reports Reviewed Today Include:   CT abdomen pelvis -   "  Study is inadequate to characterize the pancreatic lesion as it was not performed without any contrast   Again, recommendation is for appropriate pancreatic protocol CT or MRI   I suspect it is probably a cyst     There are also indeterminate renal and hepatic low density lesions which may also be cysts  Alexi Garcia can probably be better characterized on the follow-up pancreatic protocol study  There is colonic diverticulosis  There is right lower lobe pneumonia "  Imaging Personally Reviewed by Myself Includes:  As above       Recent Cultures (last 7 days):       Results from last 7 days  Lab Units 11/29/18  1146 11/27/18  2311 11/27/18  2305 11/27/18  2304   SPUTUM CULTURE   --   --  2+ Growth of   --    GRAM STAIN RESULT  Rare Polys  Rare Gram positive cocci in pairs  --  1+ Epithelial cells per low power field  No Polys  Rare Gram negative rods  --    INFLUENZA B PCR   --  None Detected  --   --    RSV PCR   --  None Detected  --   --    LEGIONELLA URINARY ANTIGEN   --   --   --  Negative       Last 24 Hours Medication List:     Current Facility-Administered Medications:  albuterol 2 puff Inhalation Q4H PRN Tammy Marcus MD    aluminum-magnesium hydroxide-simethicone 30 mL Oral Q6H PRN Ivanna Lugo MD    atorvastatin 40 mg Oral Daily With Narda Montanez MD    cefTRIAXone 1,000 mg Intravenous Q24H Ivanna Lugo MD Last Rate: Stopped (11/30/18 1803)   insulin lispro 1-5 Units Subcutaneous HS Ivanna Lugo MD    insulin lispro 1-6 Units Subcutaneous TID AC Ivanna Lugo MD    lisinopril 40 mg Oral Daily Marissa Henning MD    metoprolol succinate 50 mg Oral Daily Marissa Henning MD    metroNIDAZOLE 500 mg Intravenous Q8H [place], [place] Last Rate: Stopped (12/01/18 0911)   nicotine 1 patch Transdermal Daily Marissa Henning MD         Today, Patient Was Seen By: Miguel Ángel Cruz MD    ** Please Note: Dictation voice to text software may have been used in the creation of this document   **

## 2018-12-01 NOTE — ASSESSMENT & PLAN NOTE
Lab Results   Component Value Date    HGBA1C 6 1 11/28/2018     Recent Labs      11/30/18   1658  11/30/18   2058  12/01/18   0804  12/01/18   1200   POCGLU  108  102  94  102     Blood Sugar Average: Last 72 hrs:  (P) 88 [id number]     C/w metformin

## 2018-12-01 NOTE — ASSESSMENT & PLAN NOTE
Awaiting MRI of abdomen to further characterize abdominal mass  Discussed with radiologist on the phone     Lesion on pancreas is most likely cyst    Will need outpatient follow up with repeat imaging in 6 months and possible GI referral

## 2018-12-01 NOTE — DISCHARGE INSTRUCTIONS
You did have an MRI of your abdomen which showed a pancreatic cyst which may represent IPMN and will need follow up with your PCP  You will need repeat MRI in 6 months time  You may need a GI referral as well

## 2018-12-03 LAB — HIV 1+2 AB+HIV1 P24 AG SERPL QL IA: NORMAL

## 2018-12-04 ENCOUNTER — HOSPITAL ENCOUNTER (OUTPATIENT)
Facility: HOSPITAL | Age: 58
Setting detail: OBSERVATION
Discharge: HOME | End: 2018-12-04
Attending: INTERNAL MEDICINE | Admitting: INTERNAL MEDICINE
Payer: COMMERCIAL

## 2018-12-04 VITALS
RESPIRATION RATE: 20 BRPM | TEMPERATURE: 97.5 F | BODY MASS INDEX: 33.49 KG/M2 | HEIGHT: 76 IN | DIASTOLIC BLOOD PRESSURE: 91 MMHG | WEIGHT: 275 LBS | SYSTOLIC BLOOD PRESSURE: 134 MMHG | HEART RATE: 63 BPM | OXYGEN SATURATION: 94 %

## 2018-12-04 DIAGNOSIS — Z88.9 DRUG ALLERGY: Primary | ICD-10-CM

## 2018-12-04 DIAGNOSIS — R22.0 LIP SWELLING: ICD-10-CM

## 2018-12-04 DIAGNOSIS — R91.8 RIGHT LOWER LOBE LUNG MASS: ICD-10-CM

## 2018-12-04 LAB
ALBUMIN SERPL BCP-MCNC: 3.4 G/DL (ref 3.5–5)
ALP SERPL-CCNC: 62 U/L (ref 46–116)
ALT SERPL W P-5'-P-CCNC: 29 U/L (ref 12–78)
ANION GAP SERPL CALCULATED.3IONS-SCNC: 8 MMOL/L (ref 4–13)
AST SERPL W P-5'-P-CCNC: 20 U/L (ref 5–45)
BASOPHILS # BLD AUTO: 0.05 THOUSANDS/ΜL (ref 0–0.1)
BASOPHILS NFR BLD AUTO: 1 % (ref 0–1)
BILIRUB SERPL-MCNC: 0.27 MG/DL (ref 0.2–1)
BUN SERPL-MCNC: 12 MG/DL (ref 5–25)
CALCIUM SERPL-MCNC: 8.6 MG/DL (ref 8.3–10.1)
CHLORIDE SERPL-SCNC: 111 MMOL/L (ref 100–108)
CO2 SERPL-SCNC: 24 MMOL/L (ref 21–32)
CREAT SERPL-MCNC: 0.84 MG/DL (ref 0.6–1.3)
EOSINOPHIL # BLD AUTO: 0.19 THOUSAND/ΜL (ref 0–0.61)
EOSINOPHIL NFR BLD AUTO: 3 % (ref 0–6)
ERYTHROCYTE [DISTWIDTH] IN BLOOD BY AUTOMATED COUNT: 13 % (ref 11.6–15.1)
GFR SERPL CREATININE-BSD FRML MDRD: 111 ML/MIN/1.73SQ M
GLUCOSE SERPL-MCNC: 101 MG/DL (ref 65–140)
GLUCOSE SERPL-MCNC: 103 MG/DL (ref 65–140)
GLUCOSE SERPL-MCNC: 123 MG/DL (ref 65–140)
HCT VFR BLD AUTO: 44.7 % (ref 36.5–49.3)
HGB BLD-MCNC: 14.3 G/DL (ref 12–17)
HOLD SPECIMEN: NORMAL
IMM GRANULOCYTES # BLD AUTO: 0.05 THOUSAND/UL (ref 0–0.2)
IMM GRANULOCYTES NFR BLD AUTO: 1 % (ref 0–2)
LYMPHOCYTES # BLD AUTO: 2.46 THOUSANDS/ΜL (ref 0.6–4.47)
LYMPHOCYTES NFR BLD AUTO: 39 % (ref 14–44)
MCH RBC QN AUTO: 29.7 PG (ref 26.8–34.3)
MCHC RBC AUTO-ENTMCNC: 32 G/DL (ref 31.4–37.4)
MCV RBC AUTO: 93 FL (ref 82–98)
MONOCYTES # BLD AUTO: 0.43 THOUSAND/ΜL (ref 0.17–1.22)
MONOCYTES NFR BLD AUTO: 7 % (ref 4–12)
NEUTROPHILS # BLD AUTO: 3.11 THOUSANDS/ΜL (ref 1.85–7.62)
NEUTS SEG NFR BLD AUTO: 49 % (ref 43–75)
NRBC BLD AUTO-RTO: 0 /100 WBCS
PLATELET # BLD AUTO: 301 THOUSANDS/UL (ref 149–390)
PMV BLD AUTO: 9 FL (ref 8.9–12.7)
POTASSIUM SERPL-SCNC: 4 MMOL/L (ref 3.5–5.3)
PROT SERPL-MCNC: 6.8 G/DL (ref 6.4–8.2)
RBC # BLD AUTO: 4.82 MILLION/UL (ref 3.88–5.62)
SODIUM SERPL-SCNC: 143 MMOL/L (ref 136–145)
WBC # BLD AUTO: 6.29 THOUSAND/UL (ref 4.31–10.16)

## 2018-12-04 PROCEDURE — 82948 REAGENT STRIP/BLOOD GLUCOSE: CPT

## 2018-12-04 PROCEDURE — 99220 PR INITIAL OBSERVATION CARE/DAY 70 MINUTES: CPT | Performed by: INTERNAL MEDICINE

## 2018-12-04 PROCEDURE — 80053 COMPREHEN METABOLIC PANEL: CPT | Performed by: INTERNAL MEDICINE

## 2018-12-04 PROCEDURE — 36415 COLL VENOUS BLD VENIPUNCTURE: CPT | Performed by: INTERNAL MEDICINE

## 2018-12-04 PROCEDURE — 85025 COMPLETE CBC W/AUTO DIFF WBC: CPT | Performed by: INTERNAL MEDICINE

## 2018-12-04 PROCEDURE — 99285 EMERGENCY DEPT VISIT HI MDM: CPT

## 2018-12-04 PROCEDURE — 99217 PR OBSERVATION CARE DISCHARGE MANAGEMENT: CPT | Performed by: PHYSICIAN ASSISTANT

## 2018-12-04 RX ORDER — ALBUTEROL SULFATE 90 UG/1
2 AEROSOL, METERED RESPIRATORY (INHALATION) EVERY 4 HOURS PRN
Status: DISCONTINUED | OUTPATIENT
Start: 2018-12-04 | End: 2018-12-04 | Stop reason: HOSPADM

## 2018-12-04 RX ORDER — ATORVASTATIN CALCIUM 40 MG/1
40 TABLET, FILM COATED ORAL
Status: DISCONTINUED | OUTPATIENT
Start: 2018-12-04 | End: 2018-12-04 | Stop reason: HOSPADM

## 2018-12-04 RX ORDER — FAMOTIDINE 20 MG/1
20 TABLET, FILM COATED ORAL 2 TIMES DAILY
Status: DISCONTINUED | OUTPATIENT
Start: 2018-12-04 | End: 2018-12-04 | Stop reason: HOSPADM

## 2018-12-04 RX ORDER — PREDNISONE 20 MG/1
20 TABLET ORAL DAILY
Qty: 5 TABLET | Refills: 0 | Status: SHIPPED | OUTPATIENT
Start: 2018-12-05

## 2018-12-04 RX ORDER — ASPIRIN 81 MG/1
81 TABLET, CHEWABLE ORAL DAILY
Status: DISCONTINUED | OUTPATIENT
Start: 2018-12-04 | End: 2018-12-04 | Stop reason: HOSPADM

## 2018-12-04 RX ORDER — NICOTINE 21 MG/24HR
1 PATCH, TRANSDERMAL 24 HOURS TRANSDERMAL DAILY
Status: DISCONTINUED | OUTPATIENT
Start: 2018-12-04 | End: 2018-12-04 | Stop reason: HOSPADM

## 2018-12-04 RX ORDER — ONDANSETRON 2 MG/ML
4 INJECTION INTRAMUSCULAR; INTRAVENOUS EVERY 6 HOURS PRN
Status: DISCONTINUED | OUTPATIENT
Start: 2018-12-04 | End: 2018-12-04 | Stop reason: HOSPADM

## 2018-12-04 RX ORDER — PREDNISONE 20 MG/1
20 TABLET ORAL DAILY
Status: DISCONTINUED | OUTPATIENT
Start: 2018-12-04 | End: 2018-12-04 | Stop reason: HOSPADM

## 2018-12-04 RX ORDER — PREDNISONE 50 MG/1
50 TABLET ORAL DAILY
Qty: 4 TABLET | Refills: 0 | OUTPATIENT
Start: 2018-12-05 | End: 2018-12-04

## 2018-12-04 RX ORDER — LEVOFLOXACIN 750 MG/1
750 TABLET ORAL EVERY 24 HOURS
Status: DISCONTINUED | OUTPATIENT
Start: 2018-12-04 | End: 2018-12-04 | Stop reason: HOSPADM

## 2018-12-04 RX ORDER — LORATADINE 10 MG/1
10 TABLET ORAL DAILY
Qty: 30 TABLET | Refills: 0 | Status: SHIPPED | OUTPATIENT
Start: 2018-12-05

## 2018-12-04 RX ORDER — LISINOPRIL 20 MG/1
40 TABLET ORAL DAILY
Status: DISCONTINUED | OUTPATIENT
Start: 2018-12-04 | End: 2018-12-04 | Stop reason: HOSPADM

## 2018-12-04 RX ORDER — FAMOTIDINE 20 MG/1
20 TABLET, FILM COATED ORAL ONCE
Status: COMPLETED | OUTPATIENT
Start: 2018-12-04 | End: 2018-12-04

## 2018-12-04 RX ORDER — DOCUSATE SODIUM 100 MG/1
100 CAPSULE, LIQUID FILLED ORAL 2 TIMES DAILY PRN
Status: DISCONTINUED | OUTPATIENT
Start: 2018-12-04 | End: 2018-12-04 | Stop reason: HOSPADM

## 2018-12-04 RX ORDER — LEVOFLOXACIN 750 MG/1
750 TABLET ORAL EVERY 24 HOURS
Qty: 7 TABLET | Refills: 0 | Status: SHIPPED | OUTPATIENT
Start: 2018-12-04 | End: 2018-12-11

## 2018-12-04 RX ORDER — MAGNESIUM HYDROXIDE/ALUMINUM HYDROXICE/SIMETHICONE 120; 1200; 1200 MG/30ML; MG/30ML; MG/30ML
15 SUSPENSION ORAL EVERY 6 HOURS PRN
Status: DISCONTINUED | OUTPATIENT
Start: 2018-12-04 | End: 2018-12-04

## 2018-12-04 RX ORDER — DIPHENHYDRAMINE HCL 25 MG
25 TABLET ORAL ONCE
Status: COMPLETED | OUTPATIENT
Start: 2018-12-04 | End: 2018-12-04

## 2018-12-04 RX ORDER — PREDNISONE 50 MG/1
50 TABLET ORAL DAILY
Qty: 4 TABLET | Refills: 0 | Status: SHIPPED | OUTPATIENT
Start: 2018-12-05 | End: 2018-12-04

## 2018-12-04 RX ORDER — LORATADINE 10 MG/1
10 TABLET ORAL DAILY
Status: DISCONTINUED | OUTPATIENT
Start: 2018-12-04 | End: 2018-12-04 | Stop reason: HOSPADM

## 2018-12-04 RX ORDER — FAMOTIDINE 20 MG/1
20 TABLET, FILM COATED ORAL 2 TIMES DAILY
Qty: 60 TABLET | Refills: 0 | Status: SHIPPED | OUTPATIENT
Start: 2018-12-04

## 2018-12-04 RX ORDER — METOPROLOL SUCCINATE 50 MG/1
50 TABLET, EXTENDED RELEASE ORAL DAILY
Status: DISCONTINUED | OUTPATIENT
Start: 2018-12-04 | End: 2018-12-04 | Stop reason: HOSPADM

## 2018-12-04 RX ADMIN — DIPHENHYDRAMINE HYDROCHLORIDE 25 MG: 25 TABLET ORAL at 01:07

## 2018-12-04 RX ADMIN — METOPROLOL SUCCINATE 50 MG: 50 TABLET, EXTENDED RELEASE ORAL at 09:33

## 2018-12-04 RX ADMIN — PREDNISONE 50 MG: 20 TABLET ORAL at 01:49

## 2018-12-04 RX ADMIN — LORATADINE 10 MG: 10 TABLET ORAL at 09:32

## 2018-12-04 RX ADMIN — Medication 81 MG: at 09:32

## 2018-12-04 RX ADMIN — ENOXAPARIN SODIUM 40 MG: 40 INJECTION SUBCUTANEOUS at 09:32

## 2018-12-04 RX ADMIN — LISINOPRIL 40 MG: 20 TABLET ORAL at 14:09

## 2018-12-04 RX ADMIN — METFORMIN HYDROCHLORIDE 500 MG: 500 TABLET ORAL at 12:12

## 2018-12-04 RX ADMIN — FAMOTIDINE 20 MG: 20 TABLET, FILM COATED ORAL at 01:07

## 2018-12-04 RX ADMIN — FAMOTIDINE 20 MG: 20 TABLET ORAL at 09:32

## 2018-12-04 RX ADMIN — PREDNISONE 20 MG: 20 TABLET ORAL at 09:35

## 2018-12-04 NOTE — ASSESSMENT & PLAN NOTE
Lip swelling started after taking cefdinir and metronidazole  Will put both antibiotics on hold until seen by Pulmonary (Dr Maryuri Fischer) as such was prescribed after pneumonia  Noted pulmonary was able to do bronchoscopy due to hemoptysis  Meanwhile, will place patient on Pepcid twice daily, Claritin 10 mg daily and prednisone 20 mg daily  Patient has already been placed on prednisone 40 mg x1, diphenhydramine 25 mg x1 as well as Pepcid 20 mg x1  For tonight will also get CBC with differential (look for eosinophilia?), also at least a comprehensive metabolic profile

## 2018-12-04 NOTE — ED PROVIDER NOTES
History  Chief Complaint   Patient presents with    Allergic Reaction     Pt reports bottom lip swelling starting at 10am yesterday, increasingly getting worse throughout the day  Pt states "I think it is from the two antibiotics I was put on last time I was here"  Denies SOB      Patient is a 79-year-old male with a history of recent admission for pneumonia, hypertension, DM who presents with sudden onset swelling of the lower lip  Patient reports that he took his antibiotics at approximately 10 p m     At 10:30, he noted significant swelling of his lower lip and associated globus sensation/difficulty swallowing  He denies similar symptoms in the past   He denies associated trouble breathing, chest pain, shortness of breath, nausea/vomiting, diarrhea, rash, or lightheadedness  Symptoms have been constant since arrival   Of note, patient has been taking lisinopril "for years"; most recent dose was this morning  Prior to Admission Medications   Prescriptions Last Dose Informant Patient Reported? Taking?    ASPIRIN 81 PO   Yes Yes   Sig: Take 81 mg by mouth   albuterol (PROVENTIL HFA,VENTOLIN HFA) 90 mcg/act inhaler   No Yes   Sig: Inhale 2 puffs every 4 (four) hours as needed for wheezing   atorvastatin (LIPITOR) 40 mg tablet   Yes Yes   Sig: Take 40 mg by mouth   cefdinir (OMNICEF) 300 mg capsule   No Yes   Sig: Take 1 capsule (300 mg total) by mouth every 12 (twelve) hours for 4 days   lisinopril (ZESTRIL) 40 mg tablet   Yes Yes   Sig: Take 40 mg by mouth   metFORMIN (GLUCOPHAGE) 500 mg tablet   Yes Yes   Sig: Take 500 mg by mouth   metoprolol succinate (TOPROL-XL) 50 mg 24 hr tablet   Yes Yes   Sig: Take 50 mg by mouth   metroNIDAZOLE (FLAGYL) 250 mg tablet   No Yes   Sig: Take 2 tablets (500 mg total) by mouth every 8 (eight) hours for 4 days      Facility-Administered Medications: None       Past Medical History:   Diagnosis Date    Diabetes mellitus ([facility] [address] )     Hyperlipidemia     Hypertension     Sciatica        Past Surgical History:   Procedure Laterality Date    BREAST LUMPECTOMY      left breast; approx 50 yrs ago    NM BRONCHOSCOPY,DIAGNOSTIC N/A 11/29/2018    Procedure: BRONCHOSCOPY FLEXIBLE;  Surgeon: Marii Olea DO;  Location: [facility]; Service: Pulmonary    REPAIR KNEE LIGAMENT Left     ROTATOR CUFF REPAIR Right        Family History   Problem Relation Age of Onset    Lung cancer Father      I have reviewed and agree with the history as documented  Social History   Substance Use Topics    Smoking status: Current Every Day Smoker     Packs/day: 2 00     Years: 30 00     Types: Cigarettes    Smokeless tobacco: Never Used    Alcohol use No        Review of Systems   Constitutional: Negative for chills, fatigue and fever  HENT: Negative for congestion and sore throat  Eyes: Negative for visual disturbance  Respiratory: Negative for cough, choking, shortness of breath and wheezing  Cardiovascular: Negative for chest pain and palpitations  Gastrointestinal: Negative for abdominal pain, diarrhea, nausea and vomiting  Endocrine: Negative for polyuria  Genitourinary: Negative for difficulty urinating and dysuria  Musculoskeletal: Negative for arthralgias  Skin: Negative for rash  Neurological: Negative for dizziness, speech difficulty, light-headedness and headaches  All other systems reviewed and are negative        Physical Exam  ED Triage Vitals [12/04/18 0031]   Temperature Pulse Respirations Blood Pressure SpO2   98 1 °F (36 7 °C) 68 20 (!) 184/87 95 %      Temp Source Heart Rate Source Patient Position - Orthostatic VS BP Location FiO2 (%)   Oral Monitor Lying Right arm --      Pain Score       No Pain           Orthostatic Vital Signs  Vitals:    12/04/18 0031 12/04/18 0230 12/04/18 0343 12/04/18 0846   BP: (!) 184/87 151/93 148/91 134/91   Pulse: 68 62 60 63   Patient Position - Orthostatic VS: Lying Lying Lying Lying       Physical Exam   Constitutional: He is oriented to person, place, and time  He appears well-developed and well-nourished  No distress  HENT:   Head: Normocephalic and atraumatic  Right Ear: External ear normal    Left Ear: External ear normal    Mouth/Throat: Uvula is midline  No uvula swelling  No oropharyngeal exudate  Prominent lower lip swelling  No tongue or oropharyngeal edema  Normal speech  No stridor  Eyes: Pupils are equal, round, and reactive to light  EOM are normal  No scleral icterus  Neck: Normal range of motion  Neck supple  Cardiovascular: Normal rate, regular rhythm and normal heart sounds  Pulmonary/Chest: Effort normal and breath sounds normal  No respiratory distress  Abdominal: Soft  Bowel sounds are normal  There is no tenderness  There is no rebound and no guarding  Musculoskeletal: Normal range of motion  Neurological: He is alert and oriented to person, place, and time  Skin: Skin is warm and dry  No rash noted  Psychiatric: He has a normal mood and affect  Nursing note and vitals reviewed  ED Medications  Medications   diphenhydrAMINE (BENADRYL) tablet 25 mg (25 mg Oral Given 12/4/18 0107)   famotidine (PEPCID) tablet 20 mg (20 mg Oral Given 12/4/18 0107)   predniSONE tablet 50 mg (50 mg Oral Given 12/4/18 0149)       Diagnostic Studies  Results Reviewed     Procedure Component Value Units Date/Time    Fingerstick Glucose (POCT) [[id number]]  (Normal) Collected:  12/04/18 0720    Lab Status:  Final result Updated:  12/04/18 0722     POC Glucose 123 mg/dl     Kennedy draw [[id number]] Collected:  12/04/18 0326    Lab Status:  Final result Specimen:  Blood Updated:  12/04/18 0501    Narrative: The following orders were created for panel order Kennedy draw    Procedure                               Abnormality         Status                     ---------                               -----------         ------                     Ron Luoer Top on RECI[[id number]] Final result               Green / Yellow tube on YMWT[[id number]]                      Final result               Green / Black tube on hold[[id number]]                       Final result               Lavender Top 3 ml on hold[[id number]]                        Final result                 Please view results for these tests on the individual orders  Comprehensive metabolic panel [[id number]]  (Abnormal) Collected:  12/04/18 0326    Lab Status:  Final result Specimen:  Blood from Arm, Left Updated:  12/04/18 0401     Sodium 143 mmol/L      Potassium 4 0 mmol/L      Chloride 111 (H) mmol/L      CO2 24 mmol/L      ANION GAP 8 mmol/L      BUN 12 mg/dL      Creatinine 0 84 mg/dL      Glucose 101 mg/dL      Calcium 8 6 mg/dL      AST 20 U/L      ALT 29 U/L      Alkaline Phosphatase 62 U/L      Total Protein 6 8 g/dL      Albumin 3 4 (L) g/dL      Total Bilirubin 0 27 mg/dL      eGFR 111 ml/min/1 73sq m     Narrative:         National Kidney Disease Education Program recommendations are as follows:  GFR calculation is accurate only with a steady state creatinine  Chronic Kidney disease less than 60 ml/min/1 73 sq  meters  Kidney failure less than 15 ml/min/1 73 sq  meters      CBC and differential [[id number]] Collected:  12/04/18 0326    Lab Status:  Final result Specimen:  Blood from Arm, Left Updated:  12/04/18 0336     WBC 6 29 Thousand/uL      RBC 4 82 Million/uL      Hemoglobin 14 3 g/dL      Hematocrit 44 7 %      MCV 93 fL      MCH 29 7 pg      MCHC 32 0 g/dL      RDW 13 0 %      MPV 9 0 fL      Platelets 702 Thousands/uL      nRBC 0 /100 WBCs      Neutrophils Relative 49 %      Immat GRANS % 1 %      Lymphocytes Relative 39 %      Monocytes Relative 7 %      Eosinophils Relative 3 %      Basophils Relative 1 %      Neutrophils Absolute 3 11 Thousands/µL      Immature Grans Absolute 0 05 Thousand/uL      Lymphocytes Absolute 2 46 Thousands/µL      Monocytes Absolute 0 43 Thousand/µL      Eosinophils Absolute 0 19 Thousand/µL      Basophils Absolute 0 05 Thousands/µL                  No orders to display         Procedures  Procedures      Phone Consults  ED Phone Contact    ED Course                               MDM  Number of Diagnoses or Management Options  Drug allergy:   Diagnosis management comments: Patient is a 50-year-old male with a history of recent admission for pneumonia, hypertension, DM who presents with sudden onset swelling of the lower lip  Exam shows significant soft tissue swelling of the lower lobe without involvement of the airway or cardiopulmonary abnormality  Concerning for allergic reaction to antibiotics versus angioedema secondary to lisinopril  Will treat with steroids, Pepcid, Benadryl and admit for monitoring of airway  CritCare Time    Disposition  Final diagnoses:   Drug allergy     Time reflects when diagnosis was documented in both MDM as applicable and the Disposition within this note     Time User Action Codes Description Comment    12/4/2018  1:22 AM Cory Marshall Add [place] Negus  3XXA] Angioedema     12/4/2018  1:23 AM Martha Marshall Rise [C56  3XXA] Angioedema     12/4/2018  1:23 AM Cory Guzman Add [Z88 9] Drug allergy     12/4/2018  2:26 AM Cherylin Moles S Add [R22 0] Lip swelling     12/4/2018  2:26 AM Cherylin Moles S Modify [R22 0] Lip swelling     12/4/2018  2:26 AM Jesus Lie Add [R91 8] Right lower lobe lung mass     12/4/2018  2:26 AM Jesus Lie Modify [R91 8] Right lower lobe lung mass       ED Disposition     ED Disposition Condition Comment    Admit  Case was discussed with SOCORRO and the patient's admission status was agreed to be Admission Status: observation status to the service of Dr Aldo Stephen           Follow-up Information     Follow up With Specialties Details Why [address] , MD Internal Medicine Call in 1 day(s) Please call to make an appointment with your PCP within one week to discuss this hosptialization and possible antibiotic allergies  [address] [facility] Emergency Department Emergency Medicine Follow up As needed, If symptoms worsen [address]  [phone]  ED, [address] [facility], [place], [place], [address]          Discharge Medication List as of 12/4/2018  2:35 PM      START taking these medications    Details   famotidine (PEPCID) 20 mg tablet Take 1 tablet (20 mg total) by mouth 2 (two) times a day, Starting Tue 12/4/2018, Normal      levofloxacin (LEVAQUIN) 750 mg tablet Take 1 tablet (750 mg total) by mouth every 24 hours for 7 doses, Starting Tue 12/4/2018, Until Tue 12/11/2018, Normal      loratadine (CLARITIN) 10 mg tablet Take 1 tablet (10 mg total) by mouth daily, Starting Wed 12/5/2018, Normal         CONTINUE these medications which have CHANGED    Details   predniSONE 20 mg tablet Take 1 tablet (20 mg total) by mouth daily For 5 days  , Starting Wed 12/5/2018, Normal         CONTINUE these medications which have NOT CHANGED    Details   albuterol (PROVENTIL HFA,VENTOLIN HFA) 90 mcg/act inhaler Inhale 2 puffs every 4 (four) hours as needed for wheezing, Starting Sat 12/1/2018, Normal      ASPIRIN 81 PO Take 81 mg by mouth, Historical Med      atorvastatin (LIPITOR) 40 mg tablet Take 40 mg by mouth, Starting Thu 5/31/2018, Historical Med      lisinopril (ZESTRIL) 40 mg tablet Take 40 mg by mouth, Starting Thu 5/31/2018, Historical Med      metFORMIN (GLUCOPHAGE) 500 mg tablet Take 500 mg by mouth, Starting Thu 8/9/2018, Until Fri 8/9/2019, Historical Med      metoprolol succinate (TOPROL-XL) 50 mg 24 hr tablet Take 50 mg by mouth, Starting Thu 5/31/2018, Historical Med         STOP taking these medications       cefdinir (OMNICEF) 300 mg capsule Comments:   Reason for Stopping:         metroNIDAZOLE (FLAGYL) 250 mg tablet Comments:   Reason for Stopping: Outpatient Discharge Orders  Ambulatory referral to Infectious Disease   Standing Status: Future  Standing Exp  Date: 06/04/19     Discharge Diet     Activity as tolerated     Call provider for:  redness, tenderness, or signs of infection (pain, swelling, redness, odor or green/yellow discharge around incision site)     Call provider for:  hives     Call provider for:  persistent dizziness or light-headedness     Call provider for:  difficulty breathing, headache or visual disturbances         ED Provider  Attending physically available and evaluated [facility] SUPRIYA  I managed the patient along with the ED Attending      Electronically Signed by         Karla Jay MD  12/10/18 8646

## 2018-12-04 NOTE — ASSESSMENT & PLAN NOTE
Lab Results   Component Value Date    HGBA1C 6 1 11/28/2018     Continue metformin  Also on sliding scale  Will place prednisone at 20 mg for now as patient does not have any respiratory distress plus we are concerned about the possibility of blood sugars elevation due to the effects of steroids    Recent Labs      12/01/18   1702  12/04/18   0720  12/04/18   1208   POCGLU  96  123  103       Blood Sugar Average: Last 72 hrs:  (P) 113

## 2018-12-04 NOTE — H&P
H&P- Blake Hum III 1960, 60 y o  male MRN: [id number]    Unit/Bed#: [facility] Encounter: [id number]    Primary Care Provider: Stevenson Blount MD   Date and time admitted to hospital: 12/4/2018 12:25 AM        * Lip swelling   Assessment & Plan    Lip swelling started after taking cefdinir and metronidazole  Will put both antibiotics on hold until seen by Pulmonary (Dr Adam Levi) as such was prescribed after pneumonia  Noted pulmonary was able to do bronchoscopy due to hemoptysis  Meanwhile, will place patient on Pepcid twice daily, Claritin 10 mg daily and prednisone 20 mg daily  Patient has already been placed on prednisone 40 mg x1, diphenhydramine 25 mg x1 as well as Pepcid 20 mg x1  For tonight will also get CBC with differential (look for eosinophilia?), also at least a comprehensive metabolic profile  Essential hypertension   Assessment & Plan    Continue lisinopril and metoprolol  Hyperlipidemia   Assessment & Plan    Continue Lipitor  JUNITO (obstructive sleep apnea)   Assessment & Plan    Continue CPAP at 19  Type 2 diabetes mellitus without complication [facility])   Assessment & Plan    Lab Results   Component Value Date    HGBA1C 6 1 11/28/2018     Continue metformin  Also on sliding scale  Will place prednisone at 20 mg for now as patient does not have any respiratory distress plus we are concerned about the possibility of blood sugars elevation due to the effects of steroids  Recent Labs      12/01/18   0804  12/01/18   1200  12/01/18   1702   POCGLU  94  102  96       Blood Sugar Average: Last 72 hrs:             VTE Prophylaxis: Enoxaparin (Lovenox)  / sequential compression device   Code Status: Prior full Code as discussed  POLST: There is no POLST form on file for this patient (pre-hospital)    Anticipated Length of Stay:  Patient will be admitted on an Observation basis with an anticipated length of stay of  less than 2 midnights     Justification for Hospital Stay: Please see detailed plans noted above  Chief Complaint:     Lip swelling  History of Present Illness:  Sharyn Hill III is a 60 y o  male who has a past medical history significant for nicotine use, recent admission for hemoptysis and possible pneumonia was placed on Rocephin with azithromycin and subsequently changed to cefdinir with metronidazole  He is currently on the third day of therapy  According to discharge notes, he is supposed to be on 7 days outpatient therapy  Likewise, the patient has a history of hypertension and has been on lisinopril and metoprolol  Diabetes mellitus on metformin with controlled blood sugars  He also has a recent finding of a right lower lobe mass as well as pancreatic abnormality  In any case, the patient has been sent home with continuation of antibiotic cefdinir and metronidazole  Noted is that the patient is already on lisinopril for the past 5 years  He has no history of allergies or any history of lip swelling/urticaria  He claims to have seasonal allergies  No one is sick in the house  And no fever  Patient has had some pistachios this morning  He also had a dinner consisting of a flat cake with some cream cheese icing, broccoli which chicken  He did not eat anything that is new for his diet  However after taking the cefdinir and metronidazole he began to have lower lip swelling as well as feeling of lower lip thickness  No cough  No dysphonia or dysphagia        Review of Systems:    Constitutional:  Denies fever or chills with swelling of lower lip  Eyes:  Denies change in visual acuity   HENT:  Denies nasal congestion or sore throat   Respiratory:  Denies cough or shortness of breath   Cardiovascular:  Denies chest pain or edema   GI:  Denies abdominal pain, nausea, vomiting, bloody stools or diarrhea   :  Denies dysuria   Musculoskeletal:  Denies back pain or joint pain   Integument:  Denies rash   Neurologic:  Denies headache, focal weakness or sensory changes   Endocrine:  Denies polyuria or polydipsia   Lymphatic:  Denies swollen glands   Psychiatric:  Denies depression or anxiety     Past Medical and Surgical History:   Past Medical History:   Diagnosis Date    Diabetes mellitus ([address] )     Hyperlipidemia     Hypertension     Sciatica      Past Surgical History:   Procedure Laterality Date    BREAST LUMPECTOMY      left breast; approx 50 yrs ago    DE BRONCHOSCOPY,DIAGNOSTIC N/A 11/29/2018    Procedure: BRONCHOSCOPY FLEXIBLE;  Surgeon: Hamzah Elizondo DO;  Location: [facility];   Service: Pulmonary    REPAIR KNEE LIGAMENT Left     ROTATOR CUFF REPAIR Right        Meds/Allergies:  albuterol (PROVENTIL HFA,VENTOLIN HFA) 90 mcg/act inhaler Inhale 2 puffs every 4 (four) hours as needed for wheezing Vladimir Deutsch MD Reordered   Ordered as: albuterol (PROVENTIL HFA,VENTOLIN HFA) inhaler 2 puff - 2 puff, Inhalation, Every 4 hours PRN, wheezing, Starting Tue 12/4/18 at 0219 USE WITH SPACER Shake well before use SEAL LEFTOVER/UNUSED MEDICATION IN A ZIP LOCK BAG AND SEND TO PHARMACY    ASPIRIN 81 PO Take 81 mg by mouth Vladimir Deutsch MD Reordered   Ordered as: aspirin chewable tablet 81 mg - 81 mg, Oral, Daily, First dose on Tue 12/4/18 at 0900   atorvastatin (LIPITOR) 40 mg tablet Take 40 mg by mouth Vladimir Deutsch MD Reordered   Ordered as: atorvastatin (LIPITOR) tablet 40 mg - 40 mg, Oral, Daily with dinner, First dose on Tue 12/4/18 at 1630   cefdinir (OMNICEF) 300 mg capsule Take 1 capsule (300 mg total) by mouth every 12 (twelve) hours for 4 days Vladimir Deutsch MD Not Ordered   lisinopril (ZESTRIL) 40 mg tablet Take 40 mg by mouth Vladimir Deutsch MD Reordered   Ordered as: lisinopril (ZESTRIL) tablet 40 mg - 40 mg, Oral, Daily, First dose on Tue 12/4/18 at [facility] for systolic blood pressure less than (mmHg): 110   metFORMIN (GLUCOPHAGE) 500 mg tablet Take 500 mg by mouth Vladimir Deutsch MD Reordered Ordered as: metFORMIN (GLUCOPHAGE) tablet 500 mg - 500 mg, Oral, Daily with breakfast, First dose on Tue 12/4/18 at 0730 High Alert Medication  LOOK ALIKE SOUND ALIKE MED    metoprolol succinate (TOPROL-XL) 50 mg 24 hr tablet Take 50 mg by mouth Eris Yousif MD Reordered   Ordered as: metoprolol succinate (TOPROL-XL) 24 hr tablet 50 mg - 50 mg, Oral, Daily, First dose on Tue 12/4/18 at 0900 Hold for heart rate less than 50 beats per minute  Do not crush or chew  LOOK ALIKE SOUND ALIKE MED Hold for systolic blood pressure less than (mmHg): 110   metroNIDAZOLE (FLAGYL) 250 mg tablet Take 2 tablets (500 mg total) by mouth every 8 (eight) hours for 4 days           Allergies: No Known Allergies  History:  Marital Status: Single   Occupation: employed  Patient Pre-hospital Living Situation: home  Patient Pre-hospital Level of Mobility: mobile  Patient Pre-hospital Diet Restrictions: cardiac  Substance Use History:   History   Alcohol Use No     History   Smoking Status    Current Every Day Smoker    Packs/day: 2 00    Years: 30 00    Types: Cigarettes   Smokeless Tobacco    Never Used     History   Drug Use No       Family History:  Family History   Problem Relation Age of Onset    Lung cancer Father        Physical Exam:     Vitals:   Blood Pressure: 151/93 (12/04/18 0230)  Pulse: 62 (12/04/18 0230)  Temperature: 98 1 °F (36 7 °C) (12/04/18 0031)  Temp Source: Oral (12/04/18 0031)  Respirations: 20 (12/04/18 0230)  Height: 6' 4" (193 cm) (12/04/18 0031)  Weight - Scale: 125 kg (275 lb) (12/04/18 0031)  SpO2: 97 % (12/04/18 0230)    Constitutional:  Well developed, well nourished, no acute distress, non-toxic appearance with lower lip swelling  Eyes:  PERRL, conjunctiva normal   HENT:  Atraumatic, external ears normal, nose normal, oropharynx moist, no pharyngeal exudates   Neck- normal range of motion, no tenderness, supple   Respiratory:  No respiratory distress, normal breath sounds, no rales, no wheezing Cardiovascular:  Normal rate, normal rhythm, no murmurs, no gallops, no rubs   GI:  Soft, nondistended, normal bowel sounds, nontender, no organomegaly, no mass, no rebound, no guarding   :  No costovertebral angle tenderness   Musculoskeletal:  No edema, no tenderness, no deformities  Back- no tenderness  Integument:  Well hydrated, no rash   Lymphatic:  No lymphadenopathy noted   Neurologic:  Alert &awake, communicative, CN 2-12 normal, normal motor function, normal sensory function, no focal deficits noted   Psychiatric:  Speech and behavior appropriate       Lab Results: I have personally reviewed pertinent reports  Results from last 7 days  Lab Units 11/27/18  1629   WBC Thousand/uL 6 03   HEMOGLOBIN g/dL 14 2   HEMATOCRIT % 43 7   PLATELETS Thousands/uL 275   NEUTROS PCT % 46   LYMPHS PCT % 40   MONOS PCT % 9   EOS PCT % 3       Results from last 7 days  Lab Units 11/29/18  0550  11/27/18  1629   POTASSIUM mmol/L 4 0  < > 3 8   CHLORIDE mmol/L 113*  < > 109*   CO2 mmol/L 24  < > 25   BUN mg/dL 11  < > 16   CREATININE mg/dL 0 84  < > 1 01   CALCIUM mg/dL 8 3  < > 9 6   ALK PHOS U/L  --   --  67   ALT U/L  --   --  24   AST U/L  --   --  18   < > = values in this interval not displayed  Results from last 7 days  Lab Units 11/27/18  1629   INR  0 97           Imaging: I have personally reviewed pertinent reports  Ct Abdomen Pelvis Wo Contrast    Result Date: 11/28/2018  Narrative: CT ABDOMEN AND PELVIS WITHOUT IV CONTRAST INDICATION:   Pancreatic abnormality identified on CT of chest from yesterday    COMPARISON:  CT of chest from yesterday was reviewed  TECHNIQUE:  CT examination of the abdomen and pelvis was performed without intravenous contrast   Axial, sagittal, and coronal 2D reformatted images were created from the source data and submitted for interpretation  Radiation dose length product (DLP) for this visit:  998 66 mGy-cm     This examination, like all CT scans performed in the [address] [place] [address]  [facility] Alesia, was performed utilizing techniques to minimize radiation dose exposure, including the use of iterative  reconstruction and automated exposure control  Enteric contrast was not administered  FINDINGS: The study was performed without intravenous or oral contrast   As such, it is not adequate to characterize the pancreatic finding and again I would suggest the initial recommendation for nonemergent outpatient follow-up contrast-enhanced pancreatic CT or  pancreatic MRI  ABDOMEN LOWER CHEST:  There is pneumonic appearing infiltrate in the right lower lobe is stable compared with the recent prior chest CT  Please refer to that report for specific recommendations  LIVER/BILIARY TREE:  There is an indeterminate 1 cm hypodense lesion in the right hepatic lobe on image 21 series 2  No other lesions are visible  GALLBLADDER:  No calcified gallstones  No pericholecystic inflammatory change  SPLEEN:  Unremarkable  PANCREAS:  As was seen on the recent chest CT, there is a round or oval shaped hypodense lesion in the pancreas measuring 2 1 x 2 5 cm best seen on image 22 series 2  It has smooth well-defined margins and as such is probably a cyst   Again, this cannot  be fully characterized without contrast and probably will require pancreatic MRI for further assessment  The remainder of the pancreas appears normal   No duct dilatation seen  No calcifications  No peripancreatic inflammatory changes  ADRENAL GLANDS:  Unremarkable  KIDNEYS/URETERS:  There are bilateral indeterminate low-density renal lesions which are probably cysts  These could be further assessed on follow-up imaging with contrast   On the right, there appear to be at least 3 lesions which measure 19 mm, 11 mm, and 34 mm  On the left there is an upper pole lesion which is 18 mm and an exophytic midpole lesion which is 5 cm    A posterior lesion of the mid to lower pole on the left is 9 mm and measures 21 Hounsfield units internally which might indicate that it could be a complex cystic lesion  STOMACH AND BOWEL:  There is colonic diverticulosis without diverticulitis  No bowel obstruction  APPENDIX:  No findings to suggest appendicitis  ABDOMINOPELVIC CAVITY:  No ascites or free intraperitoneal air  No lymphadenopathy  VESSELS:  Atherosclerotic changes are present  No evidence of aneurysm  PELVIS REPRODUCTIVE ORGANS:  Unremarkable for patient's age  URINARY BLADDER:  Unremarkable  ABDOMINAL WALL/INGUINAL REGIONS:  Unremarkable  OSSEOUS STRUCTURES:  Chronic bilateral L5 pars interarticularis defects with mild malalignment producing grade 1-grade 2 anterolisthesis L5 on S1  No acute bony pathology seen  Impression: Study is inadequate to characterize the pancreatic lesion as it was not performed without any contrast   Again, recommendation is for appropriate pancreatic protocol CT or MRI  I suspect it is probably a cyst  There are also indeterminate renal and hepatic low density lesions which may also be cysts  These can probably be better characterized on the follow-up pancreatic protocol study  There is colonic diverticulosis  There is right lower lobe pneumonia  Workstation performed: [id number]     Mri Abdomen W Wo Contrast And Mrcp    Result Date: 12/1/2018  Narrative: MRI OF THE ABDOMEN WITH AND WITHOUT CONTRAST WITH MRCP INDICATION:  Unspecified abdominal pain, pancreatic head mass COMPARISON: November 27, 2018 TECHNIQUE:  The following pulse sequences were obtained on a 1 5 T scanner:  Coronal and axial T2 with TE of 90 and 180 respectively, axial T2 with fat saturation, axial FIESTA fat-sat, axial T1-weighted in-and-out-of phase, axial DWI/ADC, pre-contrast axial T1 with fat saturation, post-contrast dynamic axial T1 with fat saturation at 20, 70, and 180 seconds, followed by coronal and 7 minute delayed axial T1 with fat saturation  3D MRCP images were obtained with radial thick slabs and projections    3D rendering was performed from the acquisition scanner  IV Contrast:  13 mL of gadobutrol injection (MULTI-DOSE) FINDINGS: LIVER:  General:  Normal in size and contour  No loss of signal on out-of-phase images to suggest hepatic steatosis  Lesions:  No cystic or solid lesions identified  No areas of abnormal arterial enhancement  Vasculature: Portal and hepatic veins patent without evidence of thrombosis  BILIARY TREE:  No intra-hepatic biliary ductal dilatation  Common bile duct is normal in caliber  No evidence of bile duct stricture or choledocholithiasis  No mass identified  GALLBLADDER:  Normal  PANCREAS:  There is a 3 x 2 centimeter pancreatic head cystic lesion without internal enhancement or nodularity  Therefore, this is likely a simple cyst or large side branch IPMN  Follow-up abdominal MRI/MRCP is recommended in 6 months  ADRENAL GLANDS:  Normal  SPLEEN:  Normal  KIDNEYS:  Bilateral renal cysts, largest in the left mid to upper renal pole measuring 4 9 cm  ABDOMINAL CAVITY:  No lymphadenopathy or mass  No ascites  BOWEL:  Unremarkable MRI appearance  OSSEOUS STRUCTURES:  No osseous destruction  EXTRAHEPATIC VASCULAR STRUCTURES:  Visualized vasculature is normal  ABDOMINAL WALL:  Normal  LOWER CHEST:  Ill-defined right lower lobe lung opacity  Impression: 3 x 2 centimeter pancreatic head cystic lesion without internal enhancement or nodularity  Therefore, this is likely a simple cyst or large side branch IPMN  Follow-up abdominal MRI/MRCP is recommended in 6 months  Previously noted right lung mass is not well-defined on this exam  Follow-up chest CT is recommended to assess whether this mass is resolving? Workstation performed: [id number]     Cta Ed Chest Pe Study    Result Date: 11/27/2018  Narrative: CTA - CHEST WITH IV CONTRAST - PULMONARY ANGIOGRAM INDICATION:   hemoptysis, lower extremity swelling  COMPARISON: None   TECHNIQUE: CTA examination of the chest was performed using angiographic technique according to a protocol specifically tailored to evaluate for pulmonary embolism  Axial, sagittal, and coronal 2D reformatted images were created from the source data and  submitted for interpretation  In addition, coronal 3D MIP postprocessing was performed on the acquisition scanner  Radiation dose length product (DLP) for this visit:  317 35 mGy-cm   This examination, like all CT scans performed in the [facility], was performed utilizing techniques to minimize radiation dose exposure, including the use of iterative  reconstruction and automated exposure control  IV Contrast:  85 mL of iohexol (OMNIPAQUE)  FINDINGS: PULMONARY ARTERIAL TREE:  No pulmonary embolus is seen  LUNGS:  Bilateral lung emphysematous changes predominantly right upper lobe  Right lower lobe consolidation measuring approximately 6 cm possibly related to pneumonia  Posttreatment follow-up to radiographic resolution is recommended in 1-2 months to exclude other etiology such as neoplasm which is not excluded (other less likely differential would be from a nonvisualized subsegmental pulmonary embolus)  PLEURA:  Unremarkable  HEART/GREAT VESSELS:  Unremarkable for patient's age  MEDIASTINUM AND AMBER:  Unremarkable  CHEST WALL AND LOWER NECK:   Unremarkable  VISUALIZED STRUCTURES IN THE UPPER ABDOMEN:  2 cm low attenuating lesion in the pancreatic head, correlate with nonemergent outpatient contrast enhanced abdominal MRI or CT recommended  Partially visualized low-attenuation in the right kidney  OSSEOUS STRUCTURES:  No acute fracture or destructive osseous lesion  Impression: Bilateral lung emphysematous changes predominantly right upper lobe  Right lower lobe consolidation measuring approximately 6 cm possibly related to pneumonia   Posttreatment follow-up to radiographic resolution is recommended in 1-2 months to exclude other etiology such as neoplasm which is not excluded (other less likely  differential would be from a nonvisualized subsegmental pulmonary embolus)  2 cm low attenuating lesion in the pancreatic head, correlate with nonemergent outpatient contrast enhanced abdominal MRI or CT recommended  The study was marked in EPIC for significant notification  Workstation performed: [id number]     Vas Lower Limb Venous Duplex Study, Complete Bilateral    Result Date: 11/28/2018  Narrative:  [facility] REPORT CLINICAL: Indications: Patient presents with bilateral lower extremity edema x 2 weeks  Operative History: No cardiovascular surgeries noted  Risk Factors The patient has history of HTN, Diabetes (Yes), HLD and smoking (current) 1-2 ppd  FINDINGS:  Segment  Right            Left              Impression       Impression       CFV      Normal (Patent)  Normal (Patent)     CONCLUSION:  Impression: RIGHT LOWER LIMB: No evidence of acute or chronic deep vein thrombosis  No evidence of superficial thrombophlebitis noted  Doppler evaluation shows a normal response to augmentation maneuvers  Popliteal, posterior tibial and anterior tibial arterial Doppler waveforms are triphasic  LEFT LOWER LIMB: No evidence of acute or chronic deep vein thrombosis  No evidence of superficial thrombophlebitis noted  Doppler evaluation shows a normal response to augmentation maneuvers  Popliteal, posterior tibial and anterior tibial arterial Doppler waveforms are triphasic  Technical findings were given to DO Sonia Bhat in the ED  SIGNATURE: Electronically Signed by: Dominique Rojo on 2018-11-28 10:16:32 AM        ** Please Note: Dragon 360 Dictation voice to text software was used in the creation of this document   **

## 2018-12-04 NOTE — ED ATTENDING ATTESTATION
I, [address] [place] [address], DO, saw and evaluated the patient  I have discussed the patient with the resident/non-physician practitioner and agree with the resident's/non-physician practitioner's findings, Plan of Care, and MDM as documented in the resident's/non-physician practitioner's note, except where noted  All available labs and Radiology studies were reviewed  At this point I agree with the current assessment done in the Emergency Department  I have conducted an independent evaluation of this patient a history and physical is as follows:    48-year-old male presents with sudden onset of swelling of the lower lip  Patient was recently admitted for pneumonia and is currently on antibiotics  Also is taking lisinopril for years  States that he has some difficulty swallowing and he has a globus sensation  No difficulty breathing  On exam-no acute distress, heart regular, no respiratory distress, swelling of the lower lip, airway intact  Plan-steroids, Benadryl, Pepcid but concern for angioedema secondary to ACE    Will admit for observation    Critical Care Time  CritCare Time    Procedures

## 2018-12-04 NOTE — DISCHARGE INSTRUCTIONS
Antibiotic Medication Allergy   WHAT YOU NEED TO KNOW:   An antibiotic medication allergy is a harmful reaction to an antibiotic  The reaction can start soon after you take the medicine, or days or weeks after you stop  Healthcare providers cannot know ahead of time if you will have an allergic reaction  Your immune system may become sensitive to the antibiotic the first time you take it  You may have an allergic reaction the next time  The antibiotics most likely to cause an allergic reaction are penicillins and cephalosporins  DISCHARGE INSTRUCTIONS:   Call 911 for signs or symptoms of anaphylaxis,  such as trouble breathing, swelling in your mouth or throat, or wheezing  You may also have itching, a rash, hives, or feel like you are going to faint  Return to the emergency department if:   · You have a rash with itchy, swollen, red spots  · You have blisters, or your skin is peeling  · You have trouble swallowing or your voice sounds hoarse  · You have a fast or pounding heartbeat  · Your skin or the whites of your eyes turn yellow  Contact your healthcare provider if:   · You think you are having an allergic reaction  Contact your healthcare provider before you take another dose of your antibiotic  · You have a rash  · You have a fever  · You have a sore throat or swollen glands  You will feel hard lumps when you touch your throat if your glands are swollen  · Your skin itches and becomes red when you are in the sunlight  · You have questions or concerns about your condition, allergy, or care  Medicines:   · Epinephrine  is used to treat severe allergic reactions such as anaphylaxis  · Antihistamines  decrease mild symptoms such as itching or a rash  · Steroids  reduce inflammation  · Take your medicine as directed  Contact your healthcare provider if you think your medicine is not helping or if you have side effects   Tell him of her if you are allergic to any medicine  Keep a list of the medicines, vitamins, and herbs you take  Include the amounts, and when and why you take them  Bring the list or the pill bottles to follow-up visits  Carry your medicine list with you in case of an emergency  Follow up with your healthcare provider as directed:  Ask if you need to avoid other medicines you may be allergic to  Write down your questions so you remember to ask them during your visits  Steps to take for signs or symptoms of anaphylaxis:   · Immediately  give 1 shot of epinephrine only into the outer thigh muscle  · Leave the shot in place  as directed  Your healthcare provider may recommend you leave it in place for up to 10 seconds before you remove it  This helps make sure all of the epinephrine is delivered  · Call 911 and go to the emergency department,  even if the shot improved symptoms  Do not drive yourself  Bring the used epinephrine shot with you  Safety precautions to take if you are at risk for anaphylaxis:   · Keep 2 shots of epinephrine with you at all times  You may need a second shot, because epinephrine only works for about 20 minutes and symptoms may return  Your healthcare provider can show you and family members how to give the shot  Check the expiration date every month and replace it before it expires  · Create an action plan  Your healthcare provider can help you create a written plan that explains the allergy and an emergency plan to treat a reaction  The plan explains when to give a second epinephrine shot if symptoms return or do not improve after the first  Give copies of the action plan and emergency instructions to family members, work and school staff, and  providers  Show them how to give a shot of epinephrine  · Be careful when you exercise  If you have had exercise-induced anaphylaxis, do not exercise right after you eat  Stop exercising right away if you start to develop any signs or symptoms of anaphylaxis   You may first feel tired, warm, or have itchy skin  Hives, swelling, and severe breathing problems may develop if you continue to exercise  · Carry medical alert identification  Wear medical alert jewelry or carry a card that says you have an antibiotic medicine allergy  Healthcare providers need to know that they should not give you this antibiotic  Ask your healthcare provider where to get these items  · Read medicine labels before you use any medicine  Do not take the medicine if it contains the antibiotic that you are allergic to  This includes topical medicines that you put on your skin  Ask a pharmacist if you are not sure  · Tell all healthcare providers about your allergy  Always tell your healthcare providers the names of medicines that you are allergic to and the symptoms of your allergic reactions  · Ask if you need to avoid other medicines  You may be allergic to other medicines if you had an allergic reaction to an antibiotic  Make sure you know the names of other medicines that you should not take  © 2017 [address] [employer] Information is for End User's use only and may not be sold, redistributed or otherwise used for commercial purposes  All illustrations and images included in CareNotes® are the copyrighted property of [employer]  or Rigo Polo  The above information is an  only  It is not intended as medical advice for individual conditions or treatments  Talk to your doctor, nurse or pharmacist before following any medical regimen to see if it is safe and effective for you

## 2018-12-04 NOTE — ASSESSMENT & PLAN NOTE
Lab Results   Component Value Date    HGBA1C 6 1 11/28/2018     Continue metformin  Also on sliding scale  Will place prednisone at 20 mg for now as patient does not have any respiratory distress plus we are concerned about the possibility of blood sugars elevation due to the effects of steroids    Recent Labs      12/01/18   0804  12/01/18   1200  12/01/18   1702   POCGLU  94  102  96       Blood Sugar Average: Last 72 hrs:

## 2018-12-04 NOTE — DISCHARGE SUMMARY
Discharge- Monica Block III 1960, 60 y o  male MRN: [id number]    Unit/Bed#: [facility] 350-01 Encounter: [id number]    Primary Care Provider: Tatiana Hart MD   Date and time admitted to hospital: 12/4/2018 12:25 AM        * Lip swelling   Assessment & Plan    · Lip swelling started after taking cefdinir and metronidazole  Noted pulmonary was able to do bronchoscopy due to hemoptysis  · Will place patient on Pepcid twice daily, Claritin 10 mg daily and prednisone 20 mg daily x 5 days     · Patient has already been placed on prednisone 40 mg x1, diphenhydramine 25 mg x1 as well as Pepcid 20 mg x1  · CBC with differential- unremarkable no eosinophilia or leukocytosis  · Spoke with pulmonary, d/c current omnicef and flagyl, begin levoquin 750 once daily for 7 days  · Follow up with infectious disease to discuss whether the allergy was likely to be from SALAS NIDHI or Flagyl for future antibiotic needs  Essential hypertension   Assessment & Plan    · Continue lisinopril and metoprolol  Hyperlipidemia   Assessment & Plan    Continue Lipitor  JUNITO (obstructive sleep apnea)   Assessment & Plan    Continue CPAP at 19  Type 2 diabetes mellitus without complication [facility])   Assessment & Plan    Lab Results   Component Value Date    HGBA1C 6 1 11/28/2018     Continue metformin  Also on sliding scale  Will place prednisone at 20 mg for now as patient does not have any respiratory distress plus we are concerned about the possibility of blood sugars elevation due to the effects of steroids    Recent Labs      12/01/18   1702  12/04/18   0720  12/04/18   1208   POCGLU  96  123  103       Blood Sugar Average: Last 72 hrs:  (P) 113           Discharging Physician / Practitioner: Michelle Cervantes PA-C  PCP: Tatiana Hart MD  Admission Date:   Admission Orders     Ordered        12/04/18 0212  Place in Observation (expected length of stay for this patient is less than two midnights)  Once Discharge Date: 12/04/18    Resolved Problems  Date Reviewed: 12/4/2018    None          Consultations During Hospital Stay:  · None    Procedures Performed:     · none    Significant Findings / Test Results:     · none    Incidental Findings:   · none     Test Results Pending at Discharge (will require follow up):   · none     Outpatient Tests Requested:  · Infectious disease follow up for determination of drug allergy, had reaction after taking two different antibiotics, unsure which one caused lip swelling  · Follow up with pulmonary as scheduled at last admission for repeat CT and cytology  Complications:  none    Reason for Admission: lip swelling due to probable drug reaction     Hospital Course:     Kirstin Enriquez III is a 60 y o  male patient who originally presented to the hospital on 12/4/2018 due to swelling in his lower lip  Patient reported he took his prescribed Omnicef and Flagyl at 10 pm and by 10:30 he noticed swelling in his lip  Patient denied any difficult breathing or pain  Patient reports this has never happened to him before  Patient reports he has been taking lisinopril, but reports he had been taking that for years prior to this event  Patient was given prednisone in the ED as well as Pepcid, Claritin and benadryl  Swelling in lip decreased since presentation  Patient's vital signs remained stable while admitted  Patient's labs were all found to be within normal limits, no leukocytosis or eosinophilia present  Patient continues to deny shortness of breath, throat swelling, or chest pain while admitted  Spoke to pulmonary on call, recommended discontinuing both antibiotics at this time and switching patient to Levaquin for 7 days  Also recommended follow up with ID to determine antibiotic allergies for future reference  Please see above list of diagnoses and related plan for additional information       Condition at Discharge: good     Discharge Day Visit / Exam: Subjective:  Patient feeling well today  Denies any pain, shortness of breath, or throat swelling  Patient also reports significant improvement in swelling of lower lip since presenting to the ED last night  Vitals: Blood Pressure: 134/91 (12/04/18 0846)  Pulse: 63 (12/04/18 0846)  Temperature: 97 5 °F (36 4 °C) (12/04/18 0846)  Temp Source: Oral (12/04/18 0846)  Respirations: 20 (12/04/18 0846)  Height: 6' 4" (193 cm) (12/04/18 0846)  Weight - Scale: 125 kg (275 lb) (12/04/18 0846)  SpO2: 94 % (12/04/18 0846)  Exam:   Physical Exam   Constitutional: He is oriented to person, place, and time  He appears well-nourished  No distress  HENT:   Head: Normocephalic and atraumatic  Mouth/Throat: Oropharynx is clear and moist    Eyes: Conjunctivae and EOM are normal  No scleral icterus  Neck: Neck supple  Cardiovascular: Normal rate, regular rhythm and normal heart sounds  No murmur heard  Pulmonary/Chest: Effort normal and breath sounds normal  No stridor  No respiratory distress  He has no wheezes  He has no rales  Abdominal: Soft  Bowel sounds are normal  There is no tenderness  Musculoskeletal: He exhibits no edema or deformity  Neurological: He is alert and oriented to person, place, and time  Skin: Skin is warm and dry  No rash noted  Psychiatric: He has a normal mood and affect  His behavior is normal  Thought content normal        Discussion with Family: godfather at bedside    Discharge instructions/Information to patient and family:   See after visit summary for information provided to patient and family  Provisions for Follow-Up Care:  See after visit summary for information related to follow-up care and any pertinent home health orders  Disposition:     Home    For Discharges to [facility]:   · Not Applicable to this Patient - Not Applicable to this Patient    Planned Readmission: none     Discharge Statement:  I spent 45 minutes discharging the patient   This time was spent on the day of discharge  I had direct contact with the patient on the day of discharge  Greater than 50% of the total time was spent examining patient, answering all patient questions, arranging and discussing plan of care with patient as well as directly providing post-discharge instructions  Additional time then spent on discharge activities  Discharge Medications:  See after visit summary for reconciled discharge medications provided to patient and family        ** Please Note: This note has been constructed using a voice recognition system **

## 2018-12-04 NOTE — ASSESSMENT & PLAN NOTE
· Lip swelling started after taking cefdinir and metronidazole  Noted pulmonary was able to do bronchoscopy due to hemoptysis  · Will place patient on Pepcid twice daily, Claritin 10 mg daily and prednisone 20 mg daily x 5 days     · Patient has already been placed on prednisone 40 mg x1, diphenhydramine 25 mg x1 as well as Pepcid 20 mg x1  · CBC with differential- unremarkable no eosinophilia or leukocytosis  · Spoke with pulmonary, d/c current omnicef and flagyl, begin levoquin 750 once daily for 7 days  · Follow up with infectious disease to discuss whether the allergy was likely to be from SALAS NIDHI or Flagyl for future antibiotic needs

## 2018-12-04 NOTE — RESPIRATORY THERAPY NOTE
RT  Management Note  Erlinda Morin III 60 y o  male MRN: [id number]  Unit/Bed#: [facility] Encounter: [id number]          Physical Exam:   Assessment Type: Assess only  General Appearance: Alert, Awake  Respiratory Pattern: Normal  Chest Assessment: Chest expansion symmetrical  Bilateral Breath Sounds: Clear  Cough: Non-productive, Strong  O2 Device: c-pap      Resp Comments: placced on c-pap of 19 as used at home for HS

## 2018-12-05 NOTE — RESPIRATORY THERAPY NOTE
RT Protocol Note  Blake Saunders 60 y o  male MRN: [id number]  [facility] Encounter: [id number]    Assessment    Principal Problem:    Hemoptysis  Active Problems:    Tobacco use disorder    Type 2 diabetes mellitus without complication (HCC)    JUNITO (obstructive sleep apnea)    Hyperlipidemia    Right lower lobe lung mass    Essential hypertension      Home Pulmonary Medications:  none  Home Devices/Therapy: BiPAP/CPAP    Past Medical History:   Diagnosis Date    Diabetes mellitus ([address] )     Hyperlipidemia     Hypertension     Sciatica      Social History     Social History    Marital status: Single     Spouse name: N/A    Number of children: N/A    Years of education: N/A     Social History Main Topics    Smoking status: Current Every Day Smoker     Packs/day: 2 00     Years: 30 00     Types: Cigarettes    Smokeless tobacco: Never Used    Alcohol use No    Drug use: No    Sexual activity: Not Asked     Other Topics Concern    None     Social History Narrative    None       Subjective         Objective    Physical Exam:   Assessment Type: Assess only  General Appearance: Alert, Awake  Respiratory Pattern: Normal  Chest Assessment: Chest expansion symmetrical  Bilateral Breath Sounds: Clear  Cough: Strong, Productive    Vitals:  Blood pressure 161/90, pulse 72, temperature 98 °F (36 7 °C), temperature source Oral, resp  rate 22, height 6' 4" (1 93 m), SpO2 96 %  Imaging and other studies: I have personally reviewed pertinent reports  Plan    Respiratory Plan: Vent/NIV/HFNC        Resp Comments: (P) Pt admitted with hemoptysis  He has JUNITO, but no other pulm hx  listed in his hx, however CT of chest shows emphysematous changes  Ct was negative for PE  He does not take bronchodilator therapy at home  BS are clear at this time, with no sob  I will order an albuterol MDI prn for any sob or wheezing that may develope  Xarelto yes (for new onset atrial fibrillation).  Aspirin no.  Recommend UTI screen for evaluation of urinary symptoms  Would like to get a copy of his records from the hospital that he was in.  Please arrange

## 2018-12-31 LAB — FUNGUS SPEC CULT: NORMAL

## 2019-01-15 LAB
LEGIONELLA SPEC CULT: NORMAL
MYCOBACTERIUM SPEC CULT: NORMAL
RHODAMINE-AURAMINE STN SPEC: NORMAL

## 2019-06-08 ENCOUNTER — APPOINTMENT (EMERGENCY)
Dept: CT IMAGING | Facility: HOSPITAL | Age: 59
End: 2019-06-08
Payer: COMMERCIAL

## 2019-06-08 ENCOUNTER — HOSPITAL ENCOUNTER (EMERGENCY)
Facility: HOSPITAL | Age: 59
Discharge: HOME | End: 2019-06-08
Attending: EMERGENCY MEDICINE
Payer: COMMERCIAL

## 2019-06-08 VITALS
OXYGEN SATURATION: 95 % | WEIGHT: 293.87 LBS | SYSTOLIC BLOOD PRESSURE: 145 MMHG | TEMPERATURE: 98.9 F | DIASTOLIC BLOOD PRESSURE: 98 MMHG | RESPIRATION RATE: 18 BRPM | BODY MASS INDEX: 35.77 KG/M2 | HEART RATE: 55 BPM

## 2019-06-08 DIAGNOSIS — I10 HYPERTENSION: Primary | ICD-10-CM

## 2019-06-08 DIAGNOSIS — R42 DIZZINESS: ICD-10-CM

## 2019-06-08 LAB
ALBUMIN SERPL BCP-MCNC: 3.5 G/DL (ref 3.5–5)
ALP SERPL-CCNC: 57 U/L (ref 46–116)
ALT SERPL W P-5'-P-CCNC: 19 U/L (ref 12–78)
ANION GAP SERPL CALCULATED.3IONS-SCNC: 9 MMOL/L (ref 4–13)
AST SERPL W P-5'-P-CCNC: 15 U/L (ref 5–45)
BACTERIA UR QL AUTO: ABNORMAL /HPF
BASOPHILS # BLD AUTO: 0.08 THOUSANDS/ΜL (ref 0–0.1)
BASOPHILS NFR BLD AUTO: 1 % (ref 0–1)
BILIRUB SERPL-MCNC: 0.8 MG/DL (ref 0.2–1)
BILIRUB UR QL STRIP: NEGATIVE
BUN SERPL-MCNC: 13 MG/DL (ref 5–25)
CALCIUM SERPL-MCNC: 9.4 MG/DL (ref 8.3–10.1)
CHLORIDE SERPL-SCNC: 107 MMOL/L (ref 100–108)
CLARITY UR: CLEAR
CO2 SERPL-SCNC: 26 MMOL/L (ref 21–32)
COLOR UR: YELLOW
CREAT SERPL-MCNC: 1.07 MG/DL (ref 0.6–1.3)
EOSINOPHIL # BLD AUTO: 0.21 THOUSAND/ΜL (ref 0–0.61)
EOSINOPHIL NFR BLD AUTO: 3 % (ref 0–6)
ERYTHROCYTE [DISTWIDTH] IN BLOOD BY AUTOMATED COUNT: 13.2 % (ref 11.6–15.1)
GFR SERPL CREATININE-BSD FRML MDRD: 87 ML/MIN/1.73SQ M
GLUCOSE SERPL-MCNC: 91 MG/DL (ref 65–140)
GLUCOSE UR STRIP-MCNC: NEGATIVE MG/DL
HCT VFR BLD AUTO: 46.8 % (ref 36.5–49.3)
HGB BLD-MCNC: 15.4 G/DL (ref 12–17)
HGB UR QL STRIP.AUTO: ABNORMAL
IMM GRANULOCYTES # BLD AUTO: 0.04 THOUSAND/UL (ref 0–0.2)
IMM GRANULOCYTES NFR BLD AUTO: 1 % (ref 0–2)
KETONES UR STRIP-MCNC: NEGATIVE MG/DL
LEUKOCYTE ESTERASE UR QL STRIP: NEGATIVE
LYMPHOCYTES # BLD AUTO: 3.14 THOUSANDS/ΜL (ref 0.6–4.47)
LYMPHOCYTES NFR BLD AUTO: 50 % (ref 14–44)
MCH RBC QN AUTO: 30.1 PG (ref 26.8–34.3)
MCHC RBC AUTO-ENTMCNC: 32.9 G/DL (ref 31.4–37.4)
MCV RBC AUTO: 92 FL (ref 82–98)
MONOCYTES # BLD AUTO: 0.51 THOUSAND/ΜL (ref 0.17–1.22)
MONOCYTES NFR BLD AUTO: 8 % (ref 4–12)
NEUTROPHILS # BLD AUTO: 2.32 THOUSANDS/ΜL (ref 1.85–7.62)
NEUTS SEG NFR BLD AUTO: 37 % (ref 43–75)
NITRITE UR QL STRIP: NEGATIVE
NON-SQ EPI CELLS URNS QL MICRO: ABNORMAL /HPF
NRBC BLD AUTO-RTO: 0 /100 WBCS
PH UR STRIP.AUTO: 5.5 [PH] (ref 4.5–8)
PLATELET # BLD AUTO: 278 THOUSANDS/UL (ref 149–390)
PMV BLD AUTO: 8.9 FL (ref 8.9–12.7)
POTASSIUM SERPL-SCNC: 3.5 MMOL/L (ref 3.5–5.3)
PROT SERPL-MCNC: 7 G/DL (ref 6.4–8.2)
PROT UR STRIP-MCNC: NEGATIVE MG/DL
RBC # BLD AUTO: 5.11 MILLION/UL (ref 3.88–5.62)
RBC #/AREA URNS AUTO: ABNORMAL /HPF
SODIUM SERPL-SCNC: 142 MMOL/L (ref 136–145)
SP GR UR STRIP.AUTO: 1.02 (ref 1–1.03)
TROPONIN I SERPL-MCNC: <0.02 NG/ML
UROBILINOGEN UR QL STRIP.AUTO: 1 E.U./DL
WBC # BLD AUTO: 6.3 THOUSAND/UL (ref 4.31–10.16)
WBC #/AREA URNS AUTO: ABNORMAL /HPF

## 2019-06-08 PROCEDURE — 99284 EMERGENCY DEPT VISIT MOD MDM: CPT | Performed by: EMERGENCY MEDICINE

## 2019-06-08 PROCEDURE — 80053 COMPREHEN METABOLIC PANEL: CPT | Performed by: EMERGENCY MEDICINE

## 2019-06-08 PROCEDURE — 70450 CT HEAD/BRAIN W/O DYE: CPT

## 2019-06-08 PROCEDURE — 81001 URINALYSIS AUTO W/SCOPE: CPT

## 2019-06-08 PROCEDURE — 85025 COMPLETE CBC W/AUTO DIFF WBC: CPT | Performed by: EMERGENCY MEDICINE

## 2019-06-08 PROCEDURE — 99284 EMERGENCY DEPT VISIT MOD MDM: CPT

## 2019-06-08 PROCEDURE — 36415 COLL VENOUS BLD VENIPUNCTURE: CPT | Performed by: EMERGENCY MEDICINE

## 2019-06-08 PROCEDURE — 93005 ELECTROCARDIOGRAM TRACING: CPT

## 2019-06-08 PROCEDURE — 84484 ASSAY OF TROPONIN QUANT: CPT | Performed by: EMERGENCY MEDICINE

## 2019-06-08 PROCEDURE — 96360 HYDRATION IV INFUSION INIT: CPT

## 2019-06-08 RX ADMIN — SODIUM CHLORIDE 1000 ML: 0.9 INJECTION, SOLUTION INTRAVENOUS at 19:47

## 2019-06-09 LAB
ATRIAL RATE: 62 BPM
P AXIS: 73 DEGREES
PR INTERVAL: 196 MS
QRS AXIS: 60 DEGREES
QRSD INTERVAL: 106 MS
QT INTERVAL: 410 MS
QTC INTERVAL: 410 MS
T WAVE AXIS: 61 DEGREES
VENTRICULAR RATE: 60 BPM

## 2019-06-09 PROCEDURE — 93010 ELECTROCARDIOGRAM REPORT: CPT | Performed by: INTERNAL MEDICINE

## 2021-03-30 ENCOUNTER — IMMUNIZATIONS (OUTPATIENT)
Dept: FAMILY MEDICINE CLINIC | Facility: HOSPITAL | Age: 61
End: 2021-03-30

## 2021-03-30 DIAGNOSIS — Z23 ENCOUNTER FOR IMMUNIZATION: Primary | ICD-10-CM

## 2021-03-30 PROCEDURE — 0011A SARS-COV-2 / COVID-19 MRNA VACCINE (MODERNA) 100 MCG: CPT

## 2021-03-30 PROCEDURE — 91301 SARS-COV-2 / COVID-19 MRNA VACCINE (MODERNA) 100 MCG: CPT

## 2021-04-29 ENCOUNTER — IMMUNIZATIONS (OUTPATIENT)
Dept: FAMILY MEDICINE CLINIC | Facility: HOSPITAL | Age: 61
End: 2021-04-29

## 2021-04-29 DIAGNOSIS — Z23 ENCOUNTER FOR IMMUNIZATION: Primary | ICD-10-CM

## 2021-04-29 PROCEDURE — 0012A SARS-COV-2 / COVID-19 MRNA VACCINE (MODERNA) 100 MCG: CPT

## 2021-04-29 PROCEDURE — 91301 SARS-COV-2 / COVID-19 MRNA VACCINE (MODERNA) 100 MCG: CPT

## 2022-01-01 NOTE — ASSESSMENT & PLAN NOTE
Current smoker  Smoking cessation counseling, nicotine patch while in hospital    Not quitting   Delivery

## 2025-08-06 RX ORDER — TERBINAFINE HYDROCHLORIDE 250 MG/1
250 TABLET ORAL DAILY
Qty: 42 TABLET | OUTPATIENT
Start: 2025-08-06